# Patient Record
Sex: MALE | Race: WHITE | NOT HISPANIC OR LATINO | Employment: UNEMPLOYED | URBAN - NONMETROPOLITAN AREA
[De-identification: names, ages, dates, MRNs, and addresses within clinical notes are randomized per-mention and may not be internally consistent; named-entity substitution may affect disease eponyms.]

---

## 2017-09-26 ENCOUNTER — HISTORY (OUTPATIENT)
Dept: EMERGENCY MEDICINE | Facility: OTHER | Age: 32
End: 2017-09-26

## 2017-10-15 ENCOUNTER — OFFICE VISIT - GICH (OUTPATIENT)
Dept: FAMILY MEDICINE | Facility: OTHER | Age: 32
End: 2017-10-15

## 2017-10-15 ENCOUNTER — HISTORY (OUTPATIENT)
Dept: FAMILY MEDICINE | Facility: OTHER | Age: 32
End: 2017-10-15

## 2017-10-15 DIAGNOSIS — Z13.89 ENCOUNTER FOR SCREENING FOR OTHER DISORDER: ICD-10-CM

## 2017-10-15 LAB
AMPHETAMINES QUAL: NOT DETECTED
BARBITURATES QUAL UR: NOT DETECTED
BENZODIAZEPINES QUAL: NOT DETECTED
BUPRENORPHINE QUAL URINE: NOT DETECTED
COCAINE QUAL: NOT DETECTED
METHADONE QUAL URINE: NOT DETECTED
METHAMPHETAMINE QUAL URINE: NOT DETECTED
OPIATES QUALITATIVE URINE: NOT DETECTED
OXYCODONE QUAL: NOT DETECTED
PCP QUAL URINE: NOT DETECTED
PROPOXYPHENE,QUAL - HISTORICAL: NOT DETECTED
THC METABOLITES,QUAL - HISTORICAL: ABNORMAL
TRICYC ANTI QUAL URINE: NOT DETECTED

## 2017-10-25 ENCOUNTER — COMMUNICATION - GICH (OUTPATIENT)
Dept: FAMILY MEDICINE | Facility: OTHER | Age: 32
End: 2017-10-25

## 2017-10-25 ENCOUNTER — HISTORY (OUTPATIENT)
Dept: EMERGENCY MEDICINE | Facility: OTHER | Age: 32
End: 2017-10-25

## 2017-12-12 ENCOUNTER — HISTORY (OUTPATIENT)
Dept: FAMILY MEDICINE | Facility: OTHER | Age: 32
End: 2017-12-12

## 2017-12-12 ENCOUNTER — OFFICE VISIT - GICH (OUTPATIENT)
Dept: FAMILY MEDICINE | Facility: OTHER | Age: 32
End: 2017-12-12

## 2017-12-12 DIAGNOSIS — K08.89 OTHER SPECIFIED DISORDERS OF TEETH AND SUPPORTING STRUCTURES (CODE): ICD-10-CM

## 2017-12-28 NOTE — PROGRESS NOTES
"Patient Information     Patient Name MRN Sex     Von Santiago [id number] Male 1985      Progress Notes by Annemarie Layne MD at 10/15/2017  6:45 PM     Author:  Annemarie Layne MD Service:  (none) Author Type:  Physician     Filed:  10/15/2017  7:49 PM Encounter Date:  10/15/2017 Status:  Signed     :  Annemarie Layne MD (Physician)            SUBJECTIVE:    Von Santiago is a 32 y.o. male who presents to have drug testing as his wife will not believe him when he says he is not using drugs-\"hard drugs\". He does admit to smoking marijuana on his birthday and she is aware of that. He wants to prove to her that he is clean.   He does not have a local provider and rarely comes to the clinic with most visits to ED.  HPI    No Known Allergies,   No current outpatient prescriptions on file prior to visit.     No current facility-administered medications on file prior to visit.     and   Social History       Substance Use Topics         Smoking status:  Current Every Day Smoker      Packs/day: 1.00      Years: 21.00      Types: Cigarettes      Smokeless tobacco:  Never Used      Alcohol use  1.5 oz/week     3 drink(s) per week        REVIEW OF SYSTEMS:  ROS    OBJECTIVE:  /78  Pulse 88  Ht 1.829 m (6')    EXAM:   Physical Exam   Constitutional: He is well-developed, well-nourished, and in no distress. No distress.   Skin:   Many tatoos     Nursing note and vitals reviewed.    Results for orders placed or performed in visit on 10/15/17      DRUG ABUSE SCREEN RAPID URINE INHOUSE([facility])      Result  Value Ref Range    THC METABOLITES,QUAL      Presumptive Positive-Unconfirmed Result (A) Not Detected    PCP,QUAL                  Not Detected Not Detected    COCAINE,QUAL              Not Detected Not Detected    METHAMPHETAMINE, QUALITATIVE Not Detected Not Detected    OPIATES,QUAL              Not Detected Not Detected    AMPHETAMINE, QUALITATIVE Not Detected Not Detected    " BENZODIAZEPINES,QUAL      Not Detected Not Detected    TRICYCLICS,QUAL           Not Detected Not Detected    METHADONE, QUALITATIVE Not Detected Not Detected    BARBITURATES,QUAL         Not Detected Not Detected    OXYCODONE, QUALITATIVE Not Detected Not Detected    BUPRENORPHINE, QUALITATIVE Not Detected Not Detected    PROPOXYPHENE,QUAL Not Detected Not Detected     I have personally reviewed the labs listed above.    ASSESSMENT/PLAN:    ICD-10-CM    1. Screening for mental disease/developmental disorder Z13.89 DRUG ABUSE SCREEN [facility]INE [facility]SE([facility])      DRUG ABUSE SCREEN [facility]AP[facility] URINE INHOUSE([facility])        Plan:    Results for lab printed and given to patient.   Annemarie Layne MD  7:48 PM 10/15/2017

## 2017-12-28 NOTE — TELEPHONE ENCOUNTER
Patient Information     Patient Name MRN Sex Von Plummer [id number] Male 1985      Telephone Encounter by Pastora Pierce RN at 10/25/2017  9:52 AM     Author:  Pastora Pierce RN Service:  (none) Author Type:  NURS- Registered Nurse     Filed:  10/25/2017 10:10 AM Encounter Date:  10/25/2017 Status:  Signed     :  Pastora Pierce RN (NURS- Registered Nurse)            Pt called complaining of very sharp chest pain and extreme shortness of breath starting last night into this morning.  I instructed pt to present to the ED immediately.  Pt stated he would have somebody bring him in now.  Pastora Pierce RN ....................  10/25/2017   10:00 AM    Reason for Disposition    Difficulty breathing    Protocols used: ADULT CHEST PAIN-A-AH

## 2017-12-30 NOTE — NURSING NOTE
Patient Information     Patient Name MRN Sex Von Plummer [id number] Male 1985      Nursing Note by Heidi Link at 10/15/2017  6:45 PM     Author:  Heidi Link Service:  (none) Author Type:  (none)     Filed:  10/15/2017  7:38 PM Encounter Date:  10/15/2017 Status:  Signed     :  Heidi Link            Patient presents today for drug screening.  Heidi Link LPN..............10/15/2017 7:31 PM

## 2018-01-26 VITALS — DIASTOLIC BLOOD PRESSURE: 78 MMHG | HEART RATE: 88 BPM | SYSTOLIC BLOOD PRESSURE: 122 MMHG | HEIGHT: 72 IN

## 2018-02-09 VITALS
RESPIRATION RATE: 22 BRPM | DIASTOLIC BLOOD PRESSURE: 80 MMHG | TEMPERATURE: 98 F | HEART RATE: 88 BPM | SYSTOLIC BLOOD PRESSURE: 132 MMHG | HEIGHT: 72 IN | WEIGHT: 197.6 LBS | BODY MASS INDEX: 26.76 KG/M2

## 2018-02-12 NOTE — NURSING NOTE
Patient Information     Patient Name MRN Sex Von Plummer [id number] Male 1985      Nursing Note by Yasmin Coto at 2017  5:00 PM     Author:  Yasmin Coto Service:  (none) Author Type:  NURS- Student Practical Nurse     Filed:  2017  5:49 PM Encounter Date:  2017 Status:  Signed     :  Yasmin Coto (NURS- Student Practical Nurse)            Patient presents to the clinic for mouth problem. Started yesterday. No injury to mouth that patient is aware of. States its swollen and hurts really bad. Can't eat or drink.   Yasmin Coto, YESSENIA............................ 2017 5:20 PM

## 2018-02-12 NOTE — PATIENT INSTRUCTIONS
Patient Information     Patient Name MRN Sex Von Plummer [id number] Male 1985      Patient Instructions by Serina Blas NP at 2017  5:00 PM     Author:  Serina Blas NP Service:  (none) Author Type:  PHYS- Nurse Practitioner     Filed:  2017  6:03 PM Encounter Date:  2017 Status:  Signed     :  Serina Blas NP (PHYS- Nurse Practitioner)               Index Swedish   Toothache   ________________________________________________________________________  KEY POINTS    A toothache is pain in or around a tooth caused by an infection, a crack in a tooth or a loose filling, sore gums, or sore jaw muscles.    Fixing a loose crown, damaged filling, or crack in the tooth will often take care of the toothache. If you grind your teeth, a mouth guard may help. You may need antibiotics if you have an infection.    Have regular dental checkups and cleanings, including X-rays.  ________________________________________________________________________  What is a toothache?  A toothache is pain in or around a tooth.  What is the cause?  Possible causes of a toothache are:    An infected tooth or gum    Irritation of a tooth nerve by decay    Problem with your sinuses or jaw joint (TMJ)    A fracture or crack in a tooth    A damaged or loose filling in your tooth    Repetitive motions such as chewing gum more than usual or grinding your teeth during sleep    Gums that pull away from your teeth    A loose crown    Any injury that may have damaged the tooth    A problem with your bite such as having teeth that hit sooner and more heavily than others  What are the symptoms?  Tooth pain may be sharp or throbbing. It may be constant or it may hurt only when you put pressure on the tooth. Most often you will feel pain in the area of the problem tooth, but sometimes you may feel pain in a different area. Cold or heat may make the pain much worse.  In some  cases, you may have swelling around the tooth or get a fever or headache. Sometimes there is foul-tasting drainage from an infected tooth.  How is it diagnosed?  Your healthcare provider will examine your teeth, gums, and other areas around your mouth. Your provider may also check your ear, throat, jaw, or sinuses to see if they may be causing the pain. You may have blood tests or X-rays.  How is it treated?  Treatment depends on the cause. Fixing a loose crown, damaged filling, or crack in the tooth will often take care of the toothache. If you grind your teeth, a mouth guard may help. If you have an infection, antibiotics may be prescribed. If a tooth is infected, it is important to get treatment. A dental infection can spread to other parts of the body.  How can I take care of myself?  If you have tooth pain, see your dentist for treatment. Until you can see your dentist, take these steps to help relieve the pain:    Take nonprescription pain medicine, such as acetaminophen, ibuprofen, or naproxen. Read the label and take as directed. Unless recommended by your healthcare provider, you should not take these medicines for more than 10 days.    Nonsteroidal anti-inflammatory medicines (NSAIDs), such as ibuprofen, naproxen, and aspirin, may cause stomach bleeding and other problems. These risks increase with age.    Acetaminophen may cause liver damage or other problems. Unless recommended by your provider, don't take more than 3000 milligrams (mg) in 24 hours. To make sure you don t take too much, check other medicines you take to see if they also contain acetaminophen. Ask your provider if you need to avoid drinking alcohol while taking this medicine.    Stay away from hot, cold, or sweet foods or liquids that cause discomfort.    Chew on the side that doesn't cause pain.    Put an ice pack, gel pack, or package of frozen vegetables wrapped in a cloth on your jaw every 3 to 4 hours for up to 20 minutes at a  time.    Put moist heat on your jaw for up to 30 minutes to relieve pain. Moist heat includes heat patches, a warm wet washcloth, or a hot shower. Do not use a dry heating pad.    Rinse your mouth 3 to 4 times a day with warm saltwater. This may help relieve pain and swelling.  Ask your dentist what symptoms or problems you should watch for and what to do if you have them.  Make sure you know when you should come back for a checkup.  How can I help prevent toothache?  To help prevent tooth problems:    Brush your teeth correctly for at least 2 minutes twice a day. The most important time to brush is before you go to bed at night.    Floss correctly between your teeth once a day.    Gently massage your gums with a soft toothbrush.    Rinse daily with a fluoride or antibacterial, alcohol-free mouthwash.    Limit starchy or sugary foods that can lead to tooth decay or brush your teeth right after you eat these foods. Rinsing with water or chewing sugarless gum after you eat or drink foods that contain sugar can also help. Chewing gums sweetened with Xylitol can reduce and control bacteria in your mouth.    Have regular dental checkups and cleaning, including X-rays. You may want to ask your dentist about a fluoride treatment and sealants for teeth.  Developed by [employer].  Adult Advisor 2017.2 published by [employer].  Last modified: 2017-01-03  Last reviewed: 2017-01-03  This content is reviewed periodically and is subject to change as new health information becomes available. The information is intended to inform and educate and is not a replacement for medical evaluation, advice, diagnosis or treatment by a healthcare professional.  References   Adult Advisor 2017.2 Index    Copyright   2017 [employer], a division of [employer] All rights reserved.

## 2018-02-12 NOTE — PROGRESS NOTES
Patient Information     Patient Name MRN Sex     Von Santiago [id number] Male 1985      Progress Notes by Serina Blas NP at 2017  5:00 PM     Author:  Serina Blas NP Service:  (none) Author Type:  PHYS- Nurse Practitioner     Filed:  2017  9:40 AM Encounter Date:  2017 Status:  Signed     :  Serina Blas NP (PHYS- Nurse Practitioner)            HPI:  Nursing Notes:   Yasmin Coto  2017  5:49 PM  Signed  Patient presents to the clinic for mouth problem. Started yesterday. No injury to mouth that patient is aware of. States its swollen and hurts really bad. Can't eat or drink.   Yasmin Coto LPN............................ 2017 5:20 PM      Von Santiago is a 32 y.o. male who presents to clinic today bottom gums are swollen and feels like swelling is going down into chin, upper gums are swollen too-started yesterday. Hasn't been able to eat due to pain. Denies fevers or pus pockets. Treating with Ibuprofen, brushing, flossing and mouthwash. Has called dentist, can't get into for one month.    Past Medical History:     Diagnosis  Date     Anxiety      Chondromalacia     Bilateral knee discomfort,      Depression      Marijuana dependence (HC)      Meningitis     as an infant      Other activity(E029.9)     Mother with hepatitis C at time of his birth.      Past Surgical History:      Procedure  Laterality Date     PAST SURGICAL HISTORY      Unremarkable       Social History        Substance Use Topics          Smoking status:   Current Every Day Smoker      Packs/day:  0.50      Years:  21.00      Types:  Cigarettes      Smokeless tobacco:   Never Used      Alcohol use   Yes      Comment: occasionally       No current outpatient prescriptions on file.     No current facility-administered medications for this visit.      Medications have been reviewed by me and are current to the best of my knowledge and  ability.    No Known Allergies    ROS:  Refer to HPI    /80 (Cuff Site: Left Arm, Position: Sitting, Cuff Size: Adult Regular)  Pulse 88  Temp 98  F (36.7  C) (Tympanic)   Resp 22  Ht 1.829 m (6')  Wt 89.6 kg (197 lb 9.6 oz)  BMI 26.8 kg/m2    EXAM:  General Appearance: Well appearing male, appropriate appearance for age. No acute distress  Orophayrnx: teeth with multiple fillings and dark spots that appear to be cavities, gums are mildly swollen and erythematous, no pus or tenderness noted, no facial or jaw swelling noted  Neck: supple without adenopathy  Dermatological: no rashes or lesions  Psychological: normal affect, alert and pleasant    ASSESSMENT/PLAN:    ICD-10-CM    1. Pain, dental K08.89 ibuprofen (ADVIL; MOTRIN) 800 mg tablet      penicillin v potassium (PEN-VEE K) 250 mg tablet   Gum swelling and pain that started yesterday  On exam: well appearing male without fever,  teeth with multiple fillings and dark spots that appear to be cavities, gums are mildly swollen and erythematous, no pus or tenderness noted, no facial or jaw swelling noted  Diagnosis: Dental Infection  Treat with Ibuprofen 800 mgs PO TID prn pain  PCN Vee K 250 TID 10 days  Follow up with dentist as scheduled      Patient Instructions      Index Maori   Toothache   ________________________________________________________________________  KEY POINTS    A toothache is pain in or around a tooth caused by an infection, a crack in a tooth or a loose filling, sore gums, or sore jaw muscles.    Fixing a loose crown, damaged filling, or crack in the tooth will often take care of the toothache. If you grind your teeth, a mouth guard may help. You may need antibiotics if you have an infection.    Have regular dental checkups and cleanings, including X-rays.  ________________________________________________________________________  What is a toothache?  A toothache is pain in or around a tooth.  What is the cause?  Possible causes  of a toothache are:    An infected tooth or gum    Irritation of a tooth nerve by decay    Problem with your sinuses or jaw joint (TMJ)    A fracture or crack in a tooth    A damaged or loose filling in your tooth    Repetitive motions such as chewing gum more than usual or grinding your teeth during sleep    Gums that pull away from your teeth    A loose crown    Any injury that may have damaged the tooth    A problem with your bite such as having teeth that hit sooner and more heavily than others  What are the symptoms?  Tooth pain may be sharp or throbbing. It may be constant or it may hurt only when you put pressure on the tooth. Most often you will feel pain in the area of the problem tooth, but sometimes you may feel pain in a different area. Cold or heat may make the pain much worse.  In some cases, you may have swelling around the tooth or get a fever or headache. Sometimes there is foul-tasting drainage from an infected tooth.  How is it diagnosed?  Your healthcare provider will examine your teeth, gums, and other areas around your mouth. Your provider may also check your ear, throat, jaw, or sinuses to see if they may be causing the pain. You may have blood tests or X-rays.  How is it treated?  Treatment depends on the cause. Fixing a loose crown, damaged filling, or crack in the tooth will often take care of the toothache. If you grind your teeth, a mouth guard may help. If you have an infection, antibiotics may be prescribed. If a tooth is infected, it is important to get treatment. A dental infection can spread to other parts of the body.  How can I take care of myself?  If you have tooth pain, see your dentist for treatment. Until you can see your dentist, take these steps to help relieve the pain:    Take nonprescription pain medicine, such as acetaminophen, ibuprofen, or naproxen. Read the label and take as directed. Unless recommended by your healthcare provider, you should not take these medicines  for more than 10 days.    Nonsteroidal anti-inflammatory medicines (NSAIDs), such as ibuprofen, naproxen, and aspirin, may cause stomach bleeding and other problems. These risks increase with age.    Acetaminophen may cause liver damage or other problems. Unless recommended by your provider, don't take more than 3000 milligrams (mg) in 24 hours. To make sure you don t take too much, check other medicines you take to see if they also contain acetaminophen. Ask your provider if you need to avoid drinking alcohol while taking this medicine.    Stay away from hot, cold, or sweet foods or liquids that cause discomfort.    Chew on the side that doesn't cause pain.    Put an ice pack, gel pack, or package of frozen vegetables wrapped in a cloth on your jaw every 3 to 4 hours for up to 20 minutes at a time.    Put moist heat on your jaw for up to 30 minutes to relieve pain. Moist heat includes heat patches, a warm wet washcloth, or a hot shower. Do not use a dry heating pad.    Rinse your mouth 3 to 4 times a day with warm saltwater. This may help relieve pain and swelling.  Ask your dentist what symptoms or problems you should watch for and what to do if you have them.  Make sure you know when you should come back for a checkup.  How can I help prevent toothache?  To help prevent tooth problems:    Brush your teeth correctly for at least 2 minutes twice a day. The most important time to brush is before you go to bed at night.    Floss correctly between your teeth once a day.    Gently massage your gums with a soft toothbrush.    Rinse daily with a fluoride or antibacterial, alcohol-free mouthwash.    Limit starchy or sugary foods that can lead to tooth decay or brush your teeth right after you eat these foods. Rinsing with water or chewing sugarless gum after you eat or drink foods that contain sugar can also help. Chewing gums sweetened with Xylitol can reduce and control bacteria in your mouth.    Have regular dental  checkups and cleaning, including X-rays. You may want to ask your dentist about a fluoride treatment and sealants for teeth.  Developed by [employer].  Adult Advisor 2017.2 published by [employer].  Last modified: 2017-01-03  Last reviewed: 2017-01-03  This content is reviewed periodically and is subject to change as new health information becomes available. The information is intended to inform and educate and is not a replacement for medical evaluation, advice, diagnosis or treatment by a healthcare professional.  References   Adult Advisor 2017.2 Index    Copyright   2017 [employer], a division of [employer] All rights reserved.

## 2018-02-27 ENCOUNTER — DOCUMENTATION ONLY (OUTPATIENT)
Dept: FAMILY MEDICINE | Facility: OTHER | Age: 33
End: 2018-02-27

## 2018-08-29 ENCOUNTER — HOSPITAL ENCOUNTER (EMERGENCY)
Facility: OTHER | Age: 33
Discharge: HOME | End: 2018-08-29
Attending: PHYSICIAN ASSISTANT | Admitting: PHYSICIAN ASSISTANT
Payer: COMMERCIAL

## 2018-08-29 ENCOUNTER — APPOINTMENT (OUTPATIENT)
Dept: GENERAL RADIOLOGY | Facility: OTHER | Age: 33
End: 2018-08-29
Attending: PHYSICIAN ASSISTANT
Payer: COMMERCIAL

## 2018-08-29 VITALS
HEART RATE: 95 BPM | WEIGHT: 190 LBS | OXYGEN SATURATION: 99 % | DIASTOLIC BLOOD PRESSURE: 81 MMHG | TEMPERATURE: 98.9 F | HEIGHT: 72 IN | RESPIRATION RATE: 16 BRPM | SYSTOLIC BLOOD PRESSURE: 122 MMHG | BODY MASS INDEX: 25.73 KG/M2

## 2018-08-29 DIAGNOSIS — J02.9 ACUTE PHARYNGITIS, UNSPECIFIED ETIOLOGY: ICD-10-CM

## 2018-08-29 DIAGNOSIS — R50.9 FEVER, UNSPECIFIED FEVER CAUSE: ICD-10-CM

## 2018-08-29 DIAGNOSIS — R52 BODY ACHES: ICD-10-CM

## 2018-08-29 LAB
ALBUMIN SERPL-MCNC: 4.2 G/DL (ref 3.5–5.7)
ALP SERPL-CCNC: 57 U/L (ref 34–104)
ALT SERPL W P-5'-P-CCNC: 20 U/L (ref 7–52)
ANION GAP SERPL CALCULATED.3IONS-SCNC: 7 MMOL/L (ref 3–14)
AST SERPL W P-5'-P-CCNC: 19 U/L (ref 13–39)
BASOPHILS # BLD AUTO: 0 10E9/L (ref 0–0.2)
BASOPHILS NFR BLD AUTO: 0.4 %
BILIRUB SERPL-MCNC: 0.9 MG/DL (ref 0.3–1)
BUN SERPL-MCNC: 15 MG/DL (ref 7–25)
CALCIUM SERPL-MCNC: 9.7 MG/DL (ref 8.6–10.3)
CHLORIDE SERPL-SCNC: 100 MMOL/L (ref 98–107)
CO2 SERPL-SCNC: 26 MMOL/L (ref 21–31)
CREAT SERPL-MCNC: 1.11 MG/DL (ref 0.7–1.3)
DEPRECATED S PYO AG THROAT QL EIA: NORMAL
DIFFERENTIAL METHOD BLD: NORMAL
EOSINOPHIL # BLD AUTO: 0 10E9/L (ref 0–0.7)
EOSINOPHIL NFR BLD AUTO: 0.3 %
ERYTHROCYTE [DISTWIDTH] IN BLOOD BY AUTOMATED COUNT: 13.3 % (ref 10–15)
GFR SERPL CREATININE-BSD FRML MDRD: 76 ML/MIN/1.7M2
GLUCOSE SERPL-MCNC: 81 MG/DL (ref 70–105)
HCT VFR BLD AUTO: 43.5 % (ref 40–53)
HGB BLD-MCNC: 15 G/DL (ref 13.3–17.7)
IMM GRANULOCYTES # BLD: 0 10E9/L (ref 0–0.4)
IMM GRANULOCYTES NFR BLD: 0.4 %
LYMPHOCYTES # BLD AUTO: 1.7 10E9/L (ref 0.8–5.3)
LYMPHOCYTES NFR BLD AUTO: 17 %
MCH RBC QN AUTO: 29.4 PG (ref 26.5–33)
MCHC RBC AUTO-ENTMCNC: 34.5 G/DL (ref 31.5–36.5)
MCV RBC AUTO: 85 FL (ref 78–100)
MONOCYTES # BLD AUTO: 1.2 10E9/L (ref 0–1.3)
MONOCYTES NFR BLD AUTO: 11.8 %
NEUTROPHILS # BLD AUTO: 7 10E9/L (ref 1.6–8.3)
NEUTROPHILS NFR BLD AUTO: 70.1 %
PLATELET # BLD AUTO: 228 10E9/L (ref 150–450)
POTASSIUM SERPL-SCNC: 4 MMOL/L (ref 3.5–5.1)
PROT SERPL-MCNC: 7.7 G/DL (ref 6.4–8.9)
RBC # BLD AUTO: 5.11 10E12/L (ref 4.4–5.9)
SODIUM SERPL-SCNC: 133 MMOL/L (ref 134–144)
SPECIMEN SOURCE: NORMAL
WBC # BLD AUTO: 10 10E9/L (ref 4–11)

## 2018-08-29 PROCEDURE — 71046 X-RAY EXAM CHEST 2 VIEWS: CPT

## 2018-08-29 PROCEDURE — 80053 COMPREHEN METABOLIC PANEL: CPT | Performed by: PHYSICIAN ASSISTANT

## 2018-08-29 PROCEDURE — 87880 STREP A ASSAY W/OPTIC: CPT | Performed by: EMERGENCY MEDICINE

## 2018-08-29 PROCEDURE — 99283 EMERGENCY DEPT VISIT LOW MDM: CPT | Mod: Z6 | Performed by: PHYSICIAN ASSISTANT

## 2018-08-29 PROCEDURE — 99284 EMERGENCY DEPT VISIT MOD MDM: CPT | Mod: 25 | Performed by: PHYSICIAN ASSISTANT

## 2018-08-29 PROCEDURE — 85025 COMPLETE CBC W/AUTO DIFF WBC: CPT | Performed by: PHYSICIAN ASSISTANT

## 2018-08-29 PROCEDURE — 36415 COLL VENOUS BLD VENIPUNCTURE: CPT | Performed by: PHYSICIAN ASSISTANT

## 2018-08-29 ASSESSMENT — ENCOUNTER SYMPTOMS
CHILLS: 1
ARTHRALGIAS: 0
NECK STIFFNESS: 0
SHORTNESS OF BREATH: 0
EYE REDNESS: 0
FEVER: 1
NAUSEA: 0
COLOR CHANGE: 0
ABDOMINAL PAIN: 0
CONFUSION: 0
SORE THROAT: 1
TROUBLE SWALLOWING: 0
DIFFICULTY URINATING: 0
VOMITING: 0
HEADACHES: 0
VOICE CHANGE: 0
DIARRHEA: 0
CONSTIPATION: 0

## 2018-08-29 NOTE — ED AVS SNAPSHOT
[facility]    [address]    [place] [address]    Phone:  [phone]    Fax:  [phone]                                       Von Santiago   MRN: [id number]    Department:  [facility]   Date of Visit:  8/29/2018           Patient Information     Date Of Birth          1985        Your diagnoses for this visit were:     Acute pharyngitis, unspecified etiology     Body aches     Fever, unspecified fever cause        You were seen by Boo Holguin PA-C.      Follow-up Information     Schedule an appointment as soon as possible for a visit with No Ref-Primary, Physician.    Why:  As needed, If symptoms worsen      Discharge References/Attachments     SORE THROAT, WHEN YOU HAVE A (ENGLISH)    SORE THROATS, SELF-CARE FOR (ENGLISH)    PHARYNGITIS, REPORT PENDING (ENGLISH)      24 Hour Appointment Hotline     To schedule an appointment at [facility], please call [phone]. If you don't have a family doctor or clinic, we will help you find one. [facility] clinics are conveniently located to serve the needs of you and your family.           Review of your medicines      START taking        Dose / Directions Last dose taken    amoxicillin-clavulanate 875-125 MG per tablet   Commonly known as:  AUGMENTIN   Dose:  1 tablet   Quantity:  20 tablet        Take 1 tablet by mouth 2 times daily for 10 days   Refills:  0          Our records show that you are taking the medicines listed below. If these are incorrect, please call your family doctor or clinic.        Dose / Directions Last dose taken    GABAPENTIN PO        Refills:  0                Prescriptions were sent or printed at these locations (1 Prescription)                   [facility] [address] [place], [place] - [address]   [address], [facility] [address]    Telephone:  [phone]   Fax:  [phone]   Hours:                  Printed at  "Department/Unit printer (1 of 1)         amoxicillin-clavulanate (AUGMENTIN) 875-125 MG per tablet                Procedures and tests performed during your visit     CBC with platelets differential    Comprehensive metabolic panel    Rapid strep screen    XR Chest 2 Views      Orders Needing Specimen Collection     None      Pending Results     No orders found from 2018 to 2018.            Pending Culture Results     No orders found from 2018 to 2018.            Pending Results Instructions     If you had any lab results that were not finalized at the time of your Discharge, you can call the ED Lab Result RN at [phone]. You will be contacted by this team for any positive Lab results or changes in treatment. The nurses are available 7 days a week from 10A to 6:30P.  You can leave a message 24 hours per day and they will return your call.        Thank you for choosing [facility]       Thank you for choosing [facility] for your care. Our goal is always to provide you with excellent care. Hearing back from our patients is one way we can continue to improve our services. Please take a few minutes to complete the written survey that you may receive in the mail after you visit with us. Thank you!        [employer] Information     [employer] lets you send messages to your doctor, view your test results, renew your prescriptions, schedule appointments and more. To sign up, go to [email or web address] . Click on \"Log in\" on the left side of the screen, which will take you to the Welcome page. Then click on \"Sign up Now\" on the right side of the page.     You will be asked to enter the access code listed below, as well as some personal information. Please follow the directions to create your username and password.     Your access code is: [email or web address]  Expires: 2018  3:40 PM     Your access code will  in 90 days. If you need help or a new code, please call your [facility] clinic or [phone].   "      Care EveryWhere ID     This is your Care EveryWhere ID. This could be used by other organizations to access your [facility] medical records  [id number]        Equal Access to Services     GAIL REED : Pat Juarez, jeff dowling, venessa martinez. So [facility] [phone].    ATENCIÓN: Si habla español, tiene a palomino disposición servicios gratuitos de asistencia lingüística. Llame al [phone].    We comply with applicable federal civil rights laws and [place] laws. We do not discriminate on the basis of race, color, national origin, age, disability, sex, sexual orientation, or gender identity.            After Visit Summary       This is your record. Keep this with you and show to your community pharmacist(s) and doctor(s) at your next visit.

## 2018-08-29 NOTE — ED PROVIDER NOTES
History   No chief complaint on file.    HPI Comments: This is a 32-year-old male who reports he just feels sick.  He has had a sore throat and cough as well as body aches over the past 3 days.  Reports his son had what he thinks is strep however he has not been formally tested.  Denies any lightheadedness or dizziness.  No nausea or vomiting or diarrhea.    The history is provided by the patient and the spouse.     Patient Active Problem List    Diagnosis Date Noted     Esophageal reflux 02/14/2012     Priority: Medium        Past Medical History:    Past Medical History:   Diagnosis Date     Activity, other specified      Anxiety disorder      Cannabis dependence, uncomplicated (H)      Chondromalacia      Major depressive disorder, single episode      Meningitis        Past Surgical History:    Past Surgical History:   Procedure Laterality Date     OTHER SURGICAL HISTORY      24376.0,PAST SURGICAL HISTORY,Unremarkable       Family History:    Family History   Problem Relation Age of Onset     Other - See Comments Mother      Mother with hepatitis C at time of his birth.     Diabetes Father      Diabetes       Social History:  Marital Status:   [2]  Social History   Substance Use Topics     Smoking status: Current Every Day Smoker     Packs/day: 0.50     Years: 21.00     Types: Cigarettes     Smokeless tobacco: Never Used     Alcohol use Yes      Comment: Alcoholic Drinks/day: occasionally        Medications:      GABAPENTIN PO         Review of Systems   Constitutional: Positive for chills and fever.   HENT: Positive for sore throat. Negative for congestion, trouble swallowing and voice change.    Eyes: Negative for redness.   Respiratory: Negative for shortness of breath.    Cardiovascular: Negative for chest pain.   Gastrointestinal: Negative for abdominal pain, constipation, diarrhea, nausea and vomiting.   Genitourinary: Negative for difficulty urinating.   Musculoskeletal: Negative for arthralgias  and neck stiffness.   Skin: Negative for color change.   Neurological: Negative for headaches.   Psychiatric/Behavioral: Negative for confusion.       Physical Exam   BP: 132/76  Pulse: 95  Temp: 98.9  F (37.2  C)  Resp: 16  Height: 182.9 cm (6')  Weight: 86.2 kg (190 lb)  SpO2: 99 %      Physical Exam   Constitutional: He is oriented to person, place, and time. No distress.   HENT:   Head: Atraumatic.   Right Ear: Tympanic membrane is erythematous and bulging.   Left Ear: Tympanic membrane is bulging.   Mouth/Throat: Mucous membranes are not pale, not dry and not cyanotic. No dental abscesses or uvula swelling. Posterior oropharyngeal erythema present. No oropharyngeal exudate, posterior oropharyngeal edema or tonsillar abscesses.   Eyes: Pupils are equal, round, and reactive to light. No scleral icterus.   Cardiovascular: Normal heart sounds and intact distal pulses.    Pulmonary/Chest: Breath sounds normal. No respiratory distress.   Abdominal: Soft. Bowel sounds are normal. There is no tenderness.   Musculoskeletal: Normal range of motion. He exhibits no edema or tenderness.   Neurological: He is alert and oriented to person, place, and time.   Skin: Skin is warm. No rash noted. He is not diaphoretic.       ED Course     ED Course     Procedures           Results for orders placed or performed during the hospital encounter of 08/29/18 (from the past 24 hour(s))   Rapid strep screen   Result Value Ref Range    Specimen Description Throat     Rapid Strep A Screen       Negative presumptive for Group A Beta Streptococcus   CBC with platelets differential   Result Value Ref Range    WBC 10.0 4.0 - 11.0 10e9/L    RBC Count 5.11 4.4 - 5.9 10e12/L    Hemoglobin 15.0 13.3 - 17.7 g/dL    Hematocrit 43.5 40.0 - 53.0 %    MCV 85 78 - 100 fl    MCH 29.4 26.5 - 33.0 pg    MCHC 34.5 31.5 - 36.5 g/dL    RDW 13.3 10.0 - 15.0 %    Platelet Count 228 150 - 450 10e9/L    Diff Method Automated Method     % Neutrophils 70.1 %    %  Lymphocytes 17.0 %    % Monocytes 11.8 %    % Eosinophils 0.3 %    % Basophils 0.4 %    % Immature Granulocytes 0.4 %    Absolute Neutrophil 7.0 1.6 - 8.3 10e9/L    Absolute Lymphocytes 1.7 0.8 - 5.3 10e9/L    Absolute Monocytes 1.2 0.0 - 1.3 10e9/L    Absolute Eosinophils 0.0 0.0 - 0.7 10e9/L    Absolute Basophils 0.0 0.0 - 0.2 10e9/L    Abs Immature Granulocytes 0.0 0 - 0.4 10e9/L   Comprehensive metabolic panel   Result Value Ref Range    Sodium 133 (L) 134 - 144 mmol/L    Potassium 4.0 3.5 - 5.1 mmol/L    Chloride 100 98 - 107 mmol/L    Carbon Dioxide 26 21 - 31 mmol/L    Anion Gap 7 3 - 14 mmol/L    Glucose 81 70 - 105 mg/dL    Urea Nitrogen 15 7 - 25 mg/dL    Creatinine 1.11 0.70 - 1.30 mg/dL    GFR Estimate 76 >60 mL/min/1.7m2    GFR Estimate If Black >90 >60 mL/min/1.7m2    Calcium 9.7 8.6 - 10.3 mg/dL    Bilirubin Total 0.9 0.3 - 1.0 mg/dL    Albumin 4.2 3.5 - 5.7 g/dL    Protein Total 7.7 6.4 - 8.9 g/dL    Alkaline Phosphatase 57 34 - 104 U/L    ALT 20 7 - 52 U/L    AST 19 13 - 39 U/L   XR Chest 2 Views    Narrative    PROCEDURE: XR CHEST 2 VW 8/29/2018 2:59 PM    HISTORY: cough;     COMPARISONS: 10/25/2017.    TECHNIQUE: 2 views.    FINDINGS: Heart and pulmonary vasculature are normal. Lungs are clear  and no pleural effusion is seen.         Impression    IMPRESSION: No acute disease.    ANA JOY MD       Medications - No data to display    Assessments & Plan (with Medical Decision Making)     I have reviewed the nursing notes.    I have reviewed the findings, diagnosis, plan and need for follow up with the patient.      Discharge Medication List as of 8/29/2018  3:40 PM      START taking these medications    Details   amoxicillin-clavulanate (AUGMENTIN) 875-125 MG per tablet Take 1 tablet by mouth 2 times daily for 10 days, Disp-20 tablet, R-0, Local Print             Final diagnoses:   Acute pharyngitis, unspecified etiology   Body aches   Fever, unspecified fever cause     Afebrile at this  time.  Vital signs stable.  3 day history of increasingly worse sore throat.  He reports he thinks his son has strep however none of this has been tested.  Increasing body aches and weakness and fatigue.  Strep test initially is negative.  Cultures pending.  He does have erythemic throat but no obvious exudate.  CBC shows normal white blood cells no left shift.  CMP is unremarkable with only minimal decrease in sodium at 133.  Chest x-ray shows no is consolidation.  He does have pharyngitis and given his body aches and fevers at home although this was not witnessed here.  We will treat him empirically he was started on Augmentin at this time.  Discussed Tylenol and Motrin for fever reduction and body aches and pains.  He denies the need for a work note.  Follow-up with his primary care if symptoms persist for further evaluation as needed.  8/29/2018   [facility]     Boo Holguin PA-C  08/29/18 [id number]

## 2018-08-29 NOTE — ED AVS SNAPSHOT
[facility]    [address]    [place] [address]    Phone:  [phone]    Fax:  [phone]                                       Von Santiago   MRN: [id number]    Department:  [facility]   Date of Visit:  8/29/2018           After Visit Summary Signature Page     I have received my discharge instructions, and my questions have been answered. I have discussed any challenges I see with this plan with the nurse or doctor.    ..........................................................................................................................................  Patient/Patient Representative Signature      ..........................................................................................................................................  Patient Representative Print Name and Relationship to Patient    ..................................................               ................................................  Date                                            Time    ..........................................................................................................................................  Reviewed by Signature/Title    ...................................................              ..............................................  Date                                                            Time          22EPIC Rev 08/18

## 2018-08-29 NOTE — ED TRIAGE NOTES
"Pt comes in reporting he is \"sick\". Pt stated \"everything\" was wrong. Pt thinks he has strep and c/o sore throat. Pt has nausea, and denies vomiting or diarrhea.    Sara Mathew RN on 8/29/2018 at 2:05 PM    "

## 2018-11-20 ENCOUNTER — APPOINTMENT (OUTPATIENT)
Dept: GENERAL RADIOLOGY | Facility: OTHER | Age: 33
End: 2018-11-20
Attending: PHYSICIAN ASSISTANT
Payer: COMMERCIAL

## 2018-11-20 ENCOUNTER — HOSPITAL ENCOUNTER (EMERGENCY)
Facility: OTHER | Age: 33
Discharge: HOME | End: 2018-11-20
Attending: PHYSICIAN ASSISTANT | Admitting: PHYSICIAN ASSISTANT
Payer: COMMERCIAL

## 2018-11-20 VITALS
DIASTOLIC BLOOD PRESSURE: 70 MMHG | OXYGEN SATURATION: 96 % | SYSTOLIC BLOOD PRESSURE: 117 MMHG | HEART RATE: 89 BPM | BODY MASS INDEX: 27.63 KG/M2 | HEIGHT: 72 IN | WEIGHT: 204 LBS | RESPIRATION RATE: 12 BRPM | TEMPERATURE: 99.5 F

## 2018-11-20 DIAGNOSIS — K62.5 RECTAL BLEEDING: ICD-10-CM

## 2018-11-20 DIAGNOSIS — K64.4 EXTERNAL HEMORRHOIDS: ICD-10-CM

## 2018-11-20 DIAGNOSIS — K29.70 VIRAL GASTRITIS: ICD-10-CM

## 2018-11-20 DIAGNOSIS — K59.01 SLOW TRANSIT CONSTIPATION: ICD-10-CM

## 2018-11-20 LAB
ALBUMIN SERPL-MCNC: 4.1 G/DL (ref 3.5–5.7)
ALBUMIN UR-MCNC: NEGATIVE MG/DL
ALP SERPL-CCNC: 49 U/L (ref 34–104)
ALT SERPL W P-5'-P-CCNC: 23 U/L (ref 7–52)
ANION GAP SERPL CALCULATED.3IONS-SCNC: 7 MMOL/L (ref 3–14)
APPEARANCE UR: CLEAR
AST SERPL W P-5'-P-CCNC: 19 U/L (ref 13–39)
BASOPHILS # BLD AUTO: 0.1 10E9/L (ref 0–0.2)
BASOPHILS NFR BLD AUTO: 0.4 %
BILIRUB SERPL-MCNC: 0.6 MG/DL (ref 0.3–1)
BILIRUB UR QL STRIP: NEGATIVE
BUN SERPL-MCNC: 19 MG/DL (ref 7–25)
CALCIUM SERPL-MCNC: 9.5 MG/DL (ref 8.6–10.3)
CHLORIDE SERPL-SCNC: 108 MMOL/L (ref 98–107)
CO2 SERPL-SCNC: 26 MMOL/L (ref 21–31)
COLOR UR AUTO: YELLOW
CREAT SERPL-MCNC: 1.01 MG/DL (ref 0.7–1.3)
D DIMER PPP DDU-MCNC: <200 NG/ML D-DU (ref 0–230)
DIFFERENTIAL METHOD BLD: ABNORMAL
EOSINOPHIL # BLD AUTO: 0.2 10E9/L (ref 0–0.7)
EOSINOPHIL NFR BLD AUTO: 1.8 %
ERYTHROCYTE [DISTWIDTH] IN BLOOD BY AUTOMATED COUNT: 13.4 % (ref 10–15)
GFR SERPL CREATININE-BSD FRML MDRD: 84 ML/MIN/1.7M2
GLUCOSE SERPL-MCNC: 115 MG/DL (ref 70–105)
GLUCOSE UR STRIP-MCNC: NEGATIVE MG/DL
HCT VFR BLD AUTO: 41.3 % (ref 40–53)
HGB BLD-MCNC: 14.4 G/DL (ref 13.3–17.7)
HGB UR QL STRIP: NEGATIVE
IMM GRANULOCYTES # BLD: 0.1 10E9/L (ref 0–0.4)
IMM GRANULOCYTES NFR BLD: 0.4 %
KETONES UR STRIP-MCNC: NEGATIVE MG/DL
LEUKOCYTE ESTERASE UR QL STRIP: NEGATIVE
LYMPHOCYTES # BLD AUTO: 2.1 10E9/L (ref 0.8–5.3)
LYMPHOCYTES NFR BLD AUTO: 18.4 %
MAGNESIUM SERPL-MCNC: 2 MG/DL (ref 1.9–2.7)
MCH RBC QN AUTO: 29.4 PG (ref 26.5–33)
MCHC RBC AUTO-ENTMCNC: 34.9 G/DL (ref 31.5–36.5)
MCV RBC AUTO: 85 FL (ref 78–100)
MONOCYTES # BLD AUTO: 0.5 10E9/L (ref 0–1.3)
MONOCYTES NFR BLD AUTO: 4.7 %
NEUTROPHILS # BLD AUTO: 8.5 10E9/L (ref 1.6–8.3)
NEUTROPHILS NFR BLD AUTO: 74.3 %
NITRATE UR QL: NEGATIVE
PH UR STRIP: 7 PH (ref 5–9)
PLATELET # BLD AUTO: 264 10E9/L (ref 150–450)
POTASSIUM SERPL-SCNC: 3.7 MMOL/L (ref 3.5–5.1)
PROT SERPL-MCNC: 6.8 G/DL (ref 6.4–8.9)
RBC # BLD AUTO: 4.89 10E12/L (ref 4.4–5.9)
SODIUM SERPL-SCNC: 141 MMOL/L (ref 134–144)
SOURCE: NORMAL
SP GR UR STRIP: 1.02 (ref 1–1.03)
UROBILINOGEN UR STRIP-ACNC: 0.2 EU/DL (ref 0.2–1)
WBC # BLD AUTO: 11.4 10E9/L (ref 4–11)

## 2018-11-20 PROCEDURE — 25000132 ZZH RX MED GY IP 250 OP 250 PS 637: Performed by: PHYSICIAN ASSISTANT

## 2018-11-20 PROCEDURE — 85379 FIBRIN DEGRADATION QUANT: CPT | Performed by: PHYSICIAN ASSISTANT

## 2018-11-20 PROCEDURE — 25000131 ZZH RX MED GY IP 250 OP 636 PS 637: Performed by: PHYSICIAN ASSISTANT

## 2018-11-20 PROCEDURE — 99284 EMERGENCY DEPT VISIT MOD MDM: CPT | Mod: 25 | Performed by: PHYSICIAN ASSISTANT

## 2018-11-20 PROCEDURE — 83735 ASSAY OF MAGNESIUM: CPT | Performed by: PHYSICIAN ASSISTANT

## 2018-11-20 PROCEDURE — 80053 COMPREHEN METABOLIC PANEL: CPT | Performed by: PHYSICIAN ASSISTANT

## 2018-11-20 PROCEDURE — 36415 COLL VENOUS BLD VENIPUNCTURE: CPT | Performed by: PHYSICIAN ASSISTANT

## 2018-11-20 PROCEDURE — 85025 COMPLETE CBC W/AUTO DIFF WBC: CPT | Performed by: PHYSICIAN ASSISTANT

## 2018-11-20 PROCEDURE — 74019 RADEX ABDOMEN 2 VIEWS: CPT

## 2018-11-20 PROCEDURE — 99283 EMERGENCY DEPT VISIT LOW MDM: CPT | Mod: Z6 | Performed by: PHYSICIAN ASSISTANT

## 2018-11-20 PROCEDURE — 81003 URINALYSIS AUTO W/O SCOPE: CPT | Performed by: PHYSICIAN ASSISTANT

## 2018-11-20 RX ORDER — METOCLOPRAMIDE 10 MG/1
10 TABLET ORAL ONCE
Status: COMPLETED | OUTPATIENT
Start: 2018-11-20 | End: 2018-11-20

## 2018-11-20 RX ORDER — METOCLOPRAMIDE 5 MG/1
10 TABLET ORAL 3 TIMES DAILY PRN
Qty: 20 TABLET | Refills: 0 | Status: SHIPPED | OUTPATIENT
Start: 2018-11-20 | End: 2018-12-30

## 2018-11-20 RX ORDER — DOCUSATE SODIUM 100 MG/1
200 CAPSULE, LIQUID FILLED ORAL ONCE
Status: COMPLETED | OUTPATIENT
Start: 2018-11-20 | End: 2018-11-20

## 2018-11-20 RX ORDER — ASPIRIN 81 MG
100 TABLET, DELAYED RELEASE (ENTERIC COATED) ORAL 2 TIMES DAILY
Qty: 10 TABLET | Refills: 1 | Status: SHIPPED | OUTPATIENT
Start: 2018-11-20 | End: 2018-12-30

## 2018-11-20 RX ORDER — FAMOTIDINE 20 MG/1
20 TABLET, FILM COATED ORAL ONCE
Status: COMPLETED | OUTPATIENT
Start: 2018-11-20 | End: 2018-11-20

## 2018-11-20 RX ORDER — ONDANSETRON 4 MG/1
4 TABLET, ORALLY DISINTEGRATING ORAL ONCE
Status: COMPLETED | OUTPATIENT
Start: 2018-11-20 | End: 2018-11-20

## 2018-11-20 RX ORDER — MAGNESIUM CARB/ALUMINUM HYDROX 105-160MG
296 TABLET,CHEWABLE ORAL ONCE
Status: COMPLETED | OUTPATIENT
Start: 2018-11-20 | End: 2018-11-20

## 2018-11-20 RX ADMIN — FAMOTIDINE 20 MG: 20 TABLET, FILM COATED ORAL at 16:56

## 2018-11-20 RX ADMIN — ONDANSETRON 4 MG: 4 TABLET, ORALLY DISINTEGRATING ORAL at 16:56

## 2018-11-20 RX ADMIN — MAGNESIUM CITRATE 296 ML: 1.75 LIQUID ORAL at 17:59

## 2018-11-20 RX ADMIN — DOCUSATE SODIUM 200 MG: 100 CAPSULE, LIQUID FILLED ORAL at 17:59

## 2018-11-20 RX ADMIN — METOCLOPRAMIDE 10 MG: 10 TABLET ORAL at 17:59

## 2018-11-20 ASSESSMENT — ENCOUNTER SYMPTOMS
DIZZINESS: 0
DIARRHEA: 1
WEAKNESS: 1
CHILLS: 0
VOMITING: 0
FEVER: 0
ABDOMINAL PAIN: 1
NAUSEA: 0
APPETITE CHANGE: 0
FATIGUE: 0
LIGHT-HEADEDNESS: 0

## 2018-11-20 NOTE — DISCHARGE INSTRUCTIONS
Treating Constipation    Constipation is a common and often uncomfortable problem. Constipation means you have bowel movements fewer than 3 times per week, or strain to pass hard, dry stool. It can last a short time. Or it can be a problem that never seems to go away. The good news is that it can often be treated and controlled.  Eat more fiber  One of the best ways to help treat constipation is to increase your fiber intake. You can do this either through diet or by using fiber supplements. Fiber (in whole grains, fruits, and vegetables) adds bulk and absorbs water to soften the stool. This helps the stool pass through the colon more easily. When you increase your fiber intake, do it slowly to avoid side effects such as bloating. Also increase the amount of water that you drink. Eating more of the following foods can add fiber to your diet.    High-fiber cereals    Whole grains, bran, and brown rice    Vegetables such as carrots, broccoli, and greens    Fresh fruits (especially apples, pears, and dried fruits like raisins and apricots)    Nuts and legumes (especially beans such as lentils, kidney beans, and lima beans)  Get physically active  Exercise helps improve the working of your colon which helps ease constipation. Try to get some physical activity every day. If you haven t been active for a while, talk to your healthcare provider before starting again.  Laxatives  Your healthcare provider may suggest an over-the-counter product to help ease your constipation. He or she may suggest the use of bulk-forming agents or laxatives. The use of laxatives, if used as directed, is common and safe. Follow directions carefully when using them. See your healthcare provider for new-onset constipation, or long-term constipation, to rule out other causes such as medicines or thyroid disease.  Date Last Reviewed: 7/1/2016 2000-2018 [employer]. [address] [place] [address], [place] [address]. All rights reserved.  This information is not intended as a substitute for professional medical care. Always follow your healthcare professional's instructions.          Eating a High-Fiber Diet  Fiber is what gives strength and structure to plants. Most grains, beans, vegetables, and fruits contain fiber. Foods rich in fiber are often low in calories and fat, and they fill you up more. They may also reduce your risks for certain health problems. To find out the amount of fiber in canned, packaged, or frozen foods, read the Nutrition Facts label. It tells you how much fiber is in one serving.    Types of fiber and their benefits  There are two types of fiber: insoluble and soluble. They both aid digestion and help you maintain a healthy weight.    Insoluble fiber. This is found in whole grains, cereals, certain fruits and vegetables such as apple skin, corn, and carrots. Insoluble fiber may prevent constipation and reduce the risk for certain types of cancer. It is called insoluble because it does not dissolve in water.    Soluble fiber. This type of fiber is in oats, beans, and certain fruits and vegetables such as strawberries and peas. Soluble fiber can reduce cholesterol, which may help lower the risk for heart disease. It also helps control blood sugar levels.  Look for high-fiber foods  Try these foods to add fiber to your diet:    Whole-grain breads and cereals. Try to eat 6 to 8 ounces a day. Include wheat and oat bran cereals, whole-wheat muffins or toast, and corn tortillas in your meals.    Fruits. Try to eat 2 cups a day. Apples, oranges, strawberries, pears, and bananas are good sources. (Note: Fruit juice is low in fiber.)    Vegetables. Try to eat at least 2.5 cups a day. Add asparagus, carrots, broccoli, peas, and corn to your meals.    Beans. One cup of cooked lentils gives you over 15 grams of fiber. Try navy beans, lentils, and chickpeas.    Seeds. A small handful of seeds gives you about 3 grams of fiber. Try  sunflower or catrachito seeds.  Keep track of your fiber  Keep track of how much fiber you eat. Start by reading food labels. Then eat a variety of foods high in fiber. As you start to eat more fiber, ask your healthcare provider how much water you should be drinking to keep your digestive system working smoothly.  Aim for a certain amount of fiber in your diet each day. If you are a woman, that amount is between 25 and 28 grams per day. Men should aim for 30 to 33 grams per day. After age 50, your daily fiber needs drop to 22 grams for women and 28 grams for men.  Before you reach for the fiber supplements, think about this. Fiber is found naturally in healthy whole foods. It gives you that feeling of fullness after you eat. Taking fiber supplements or eating fiber-enriched foods will not give you this full feeling.  Your fiber intake is a good measure for the quality of your overall diet. If you are missing out on your daily amount of fiber, you may be lacking other important nutrients as well.  Date Last Reviewed: 6/1/2017 2000-2018 [employer]. [address] [place] [address]. All rights reserved. This information is not intended as a substitute for professional medical care. Always follow your healthcare professional's instructions.          Constipation (Adult)  Constipation means that you have bowel movements that are less frequent than usual. Stools often become very hard and difficult to pass.  Constipation is very common. At some point in life, it affects almost everyone. Since everyone's bowel habits are different, what is constipation to one person may not be to another. Your healthcare provider may do tests to diagnose constipation. It depends on what he or she finds when evaluating you.    Symptoms of constipation include:    Abdominal pain    Bloating    Vomiting    Painful bowel movements    Itching, swelling, bleeding, or pain around the anus  Causes  Constipation can have many  causes. These include:    Diet low in fiber    Too much dairy    Not drinking enough liquids    Lack of exercise or physical activity (especially true for older adults)    Changes in lifestyle or daily routine, including pregnancy, aging, work, and travel    Frequent use or misuse of laxatives    Ignoring the urge to have a bowel movement or delaying it until later    Medicines, such as certain prescription pain medicines, iron supplements, antacids, certain antidepressants, and calcium supplements    Diseases like irritable bowel syndrome, bowel obstructions, stroke, diabetes, thyroid disease, Parkinson disease, hemorrhoids, and colon cancer  Complications  Potential complications of constipation can include:    Hemorrhoids    Rectal bleeding from hemorrhoids or anal fissures (skin tears)    Hernias    Dependency on laxatives    Chronic constipation    Fecal impaction, a severe form of constipation in which a large amount of hard stool is in your rectum that you can't pass    Bowel obstruction or perforation  Home care  All treatment should be done after talking with your healthcare provider. This is especially true if you have another medical problems, are taking prescription medicines, or are an older adult. Treatment most often involves lifestyle changes. You may also need medicines. Your healthcare provider will tell you which will work best for you. Follow the advice below to help avoid this problem in the future.  Lifestyle changes  These lifestyle changes can help prevent constipation:    Diet. Eat a high-fiber diet, with fresh fruit and vegetables, and reduce dairy intake, meats, and processed foods    Fluids. It's important to get enough fluids each day. Drink plenty of water when you eat more fiber. If you are on diet that limits the amount of fluid you can have, talk about this with your healthcare provider.    Regular exercise. Check with your healthcare provider first.  Medicines  Take any medicines as  directed. Some laxatives are safe to use only every now and then. Others can be taken on a regular basis. While laxatives don't cause bowel dependence, they are treating the symptoms. So your constipation may return if you don't make other changes. Talk with your healthcare provider or pharmacist if you have questions.  Prescription pain medicines can cause constipation. If you are taking this kind of medicine, ask your healthcare provider if you should also take a stool softener.  Medicines you may take to treat constipation include:    Fiber supplements    Stool softeners    Laxatives    Enemas    Rectal suppositories  Follow-up care  Follow up with your healthcare provider if symptoms don't get better in the next few days. You may need to have more tests or see a specialist.  Call 911  Call 911 if any of these occur:    Trouble breathing    Stiff, rigid abdomen that is severely painful to touch    Confusion    Fainting or loss of consciousness    Rapid heart rate    Chest pain  When to seek medical advice  Call your healthcare provider right away if any of these occur:    Fever of 100.4 F (38 C) or higher, or as directed by your healthcare provider    Failure to resume normal bowel movements    Pain in your abdomen or back gets worse    Nausea or vomiting    Swelling in your abdomen    Blood in the stool    Black, tarry stool    Involuntary weight loss    Weakness  Date Last Reviewed: 6/1/2018 2000-2018 [employer]. [address] [place] [address], [place] [address]. All rights reserved. This information is not intended as a substitute for professional medical care. Always follow your healthcare professional's instructions.

## 2018-11-20 NOTE — ED TRIAGE NOTES
ED Nursing Triage Note (General)   ________________________________    Von Santiago is a 33 year old Male that presents to triage private car  With history of  Woke up today with abdominal pain in all quadrants.  States he felt bloated and constipated, had some hard stool and then loose stool and then had rectal bleeding.  States it was bright red blood.  Last ate at 1400 and states he is feeling weak and tired, has not had this problem in the past, reported by patient   Significant symptoms had onset 8 hour(s) ago.  /60  Pulse 89  Temp 99.5  F (37.5  C) (Tympanic)  Resp 12  Ht 1.829 m (6')  Wt 92.5 kg (204 lb)  SpO2 97%  BMI 27.67 kg/m2t  Patient appears alert , in mild distress., and cooperative behavior.      Airway: intact  Breathing noted as Normal.  Circulation Normal  Skin pale  Action taken:  Triage to critical care immediately      PRE HOSPITAL PRIOR LIVING SITUATION Spouse and Children    COLUMBIA-SUICIDE SEVERITY RATING SCALE   Screen with Triage Points for Emergency Department      Ask questions that are bolded and underlined.   Past  month   Ask Questions 1 and 2 YES NO   1)  Have you wished you were dead or wished you could go to sleep and not wake up?   x   2)  Have you actually had any thoughts of killing yourself?   x   If YES to 2, ask questions 3, 4, 5, and 6.  If NO to 2, go directly to question 6.   3)  Have you been thinking about how you might do this?   E.g.  I thought about taking an overdose but I never made a specific plan as to when where or how I would actually do it .and I would never go through with it.       4)  Have you had these thoughts and had some intention of acting on them?   As opposed to  I have the thoughts but I definitely will not do anything about them.       5)  Have you started to work out or worked out the details of how to kill yourself? Do you intend to carry out this plan?      6)  Have you ever done anything, started to do anything, or prepared  to do anything to end your life?  Examples: Collected pills, obtained a gun, gave away valuables, wrote a will or suicide note, took out pills but didn t swallow any, held a gun but changed your mind or it was grabbed from your hand, went to the roof but didn t jump; or actually took pills, tried to shoot yourself, cut yourself, tried to hang yourself, etc.    If YES, ask: Was this within the past three months?  Lifetime     x    Past 3 Months        Item 1:  Behavioral Health Referral at Discharge  Item 2:  Behavioral Health Referral at Discharge   Item 3:  Behavioral Health Consult (Psychiatric Nurse/) and consider Patient Safety Precautions  Item 4:  Immediate Notification of Physician and/or Behavioral Health and Patient Safety Precautions   Item 5:  Immediate Notification of Physician and/or Behavioral Health and Patient Safety Precautions  Item 6:  Over 3 months ago: Behavioral Health Consult (Psychiatric Nurse/) and consider Patient Safety Precautions  OR  Item 6:  3 months ago or less: Immediate Notification of Physician and/or Behavioral Health and Patient Safety Precautions

## 2018-11-20 NOTE — ED AVS SNAPSHOT
[facility]    [address]    [place] [address]    Phone:  [phone]    Fax:  [phone]                                       Von Santiago   MRN: [id number]    Department:  [facility]   Date of Visit:  11/20/2018           Patient Information     Date Of Birth          1985        Your diagnoses for this visit were:     Viral gastritis     Slow transit constipation        You were seen by Boo Holguin PA-C.      Follow-up Information     Schedule an appointment as soon as possible for a visit with No Ref-Primary, Physician.    Why:  As needed, If symptoms worsen        Discharge Instructions         Treating Constipation    Constipation is a common and often uncomfortable problem. Constipation means you have bowel movements fewer than 3 times per week, or strain to pass hard, dry stool. It can last a short time. Or it can be a problem that never seems to go away. The good news is that it can often be treated and controlled.  Eat more fiber  One of the best ways to help treat constipation is to increase your fiber intake. You can do this either through diet or by using fiber supplements. Fiber (in whole grains, fruits, and vegetables) adds bulk and absorbs water to soften the stool. This helps the stool pass through the colon more easily. When you increase your fiber intake, do it slowly to avoid side effects such as bloating. Also increase the amount of water that you drink. Eating more of the following foods can add fiber to your diet.    High-fiber cereals    Whole grains, bran, and brown rice    Vegetables such as carrots, broccoli, and greens    Fresh fruits (especially apples, pears, and dried fruits like raisins and apricots)    Nuts and legumes (especially beans such as lentils, kidney beans, and lima beans)  Get physically active  Exercise helps improve the working of your colon which helps ease constipation. Try to get some  physical activity every day. If you haven t been active for a while, talk to your healthcare provider before starting again.  Laxatives  Your healthcare provider may suggest an over-the-counter product to help ease your constipation. He or she may suggest the use of bulk-forming agents or laxatives. The use of laxatives, if used as directed, is common and safe. Follow directions carefully when using them. See your healthcare provider for new-onset constipation, or long-term constipation, to rule out other causes such as medicines or thyroid disease.  Date Last Reviewed: 7/1/2016 2000-2018 [employer]. [address] [place] [address], [place] [address]. All rights reserved. This information is not intended as a substitute for professional medical care. Always follow your healthcare professional's instructions.          Eating a High-Fiber Diet  Fiber is what gives strength and structure to plants. Most grains, beans, vegetables, and fruits contain fiber. Foods rich in fiber are often low in calories and fat, and they fill you up more. They may also reduce your risks for certain health problems. To find out the amount of fiber in canned, packaged, or frozen foods, read the Nutrition Facts label. It tells you how much fiber is in one serving.    Types of fiber and their benefits  There are two types of fiber: insoluble and soluble. They both aid digestion and help you maintain a healthy weight.    Insoluble fiber. This is found in whole grains, cereals, certain fruits and vegetables such as apple skin, corn, and carrots. Insoluble fiber may prevent constipation and reduce the risk for certain types of cancer. It is called insoluble because it does not dissolve in water.    Soluble fiber. This type of fiber is in oats, beans, and certain fruits and vegetables such as strawberries and peas. Soluble fiber can reduce cholesterol, which may help lower the risk for heart disease. It also helps control blood sugar  levels.  Look for high-fiber foods  Try these foods to add fiber to your diet:    Whole-grain breads and cereals. Try to eat 6 to 8 ounces a day. Include wheat and oat bran cereals, whole-wheat muffins or toast, and corn tortillas in your meals.    Fruits. Try to eat 2 cups a day. Apples, oranges, strawberries, pears, and bananas are good sources. (Note: Fruit juice is low in fiber.)    Vegetables. Try to eat at least 2.5 cups a day. Add asparagus, carrots, broccoli, peas, and corn to your meals.    Beans. One cup of cooked lentils gives you over 15 grams of fiber. Try navy beans, lentils, and chickpeas.    Seeds. A small handful of seeds gives you about 3 grams of fiber. Try sunflower or catrachito seeds.  Keep track of your fiber  Keep track of how much fiber you eat. Start by reading food labels. Then eat a variety of foods high in fiber. As you start to eat more fiber, ask your healthcare provider how much water you should be drinking to keep your digestive system working smoothly.  Aim for a certain amount of fiber in your diet each day. If you are a woman, that amount is between 25 and 28 grams per day. Men should aim for 30 to 33 grams per day. After age 50, your daily fiber needs drop to 22 grams for women and 28 grams for men.  Before you reach for the fiber supplements, think about this. Fiber is found naturally in healthy whole foods. It gives you that feeling of fullness after you eat. Taking fiber supplements or eating fiber-enriched foods will not give you this full feeling.  Your fiber intake is a good measure for the quality of your overall diet. If you are missing out on your daily amount of fiber, you may be lacking other important nutrients as well.  Date Last Reviewed: 6/1/2017 2000-2018 [employer]. [address] [place] [address], [place] [address]. All rights reserved. This information is not intended as a substitute for professional medical care. Always follow your healthcare professional's  instructions.          Constipation (Adult)  Constipation means that you have bowel movements that are less frequent than usual. Stools often become very hard and difficult to pass.  Constipation is very common. At some point in life, it affects almost everyone. Since everyone's bowel habits are different, what is constipation to one person may not be to another. Your healthcare provider may do tests to diagnose constipation. It depends on what he or she finds when evaluating you.    Symptoms of constipation include:    Abdominal pain    Bloating    Vomiting    Painful bowel movements    Itching, swelling, bleeding, or pain around the anus  Causes  Constipation can have many causes. These include:    Diet low in fiber    Too much dairy    Not drinking enough liquids    Lack of exercise or physical activity (especially true for older adults)    Changes in lifestyle or daily routine, including pregnancy, aging, work, and travel    Frequent use or misuse of laxatives    Ignoring the urge to have a bowel movement or delaying it until later    Medicines, such as certain prescription pain medicines, iron supplements, antacids, certain antidepressants, and calcium supplements    Diseases like irritable bowel syndrome, bowel obstructions, stroke, diabetes, thyroid disease, Parkinson disease, hemorrhoids, and colon cancer  Complications  Potential complications of constipation can include:    Hemorrhoids    Rectal bleeding from hemorrhoids or anal fissures (skin tears)    Hernias    Dependency on laxatives    Chronic constipation    Fecal impaction, a severe form of constipation in which a large amount of hard stool is in your rectum that you can't pass    Bowel obstruction or perforation  Home care  All treatment should be done after talking with your healthcare provider. This is especially true if you have another medical problems, are taking prescription medicines, or are an older adult. Treatment most often involves  lifestyle changes. You may also need medicines. Your healthcare provider will tell you which will work best for you. Follow the advice below to help avoid this problem in the future.  Lifestyle changes  These lifestyle changes can help prevent constipation:    Diet. Eat a high-fiber diet, with fresh fruit and vegetables, and reduce dairy intake, meats, and processed foods    Fluids. It's important to get enough fluids each day. Drink plenty of water when you eat more fiber. If you are on diet that limits the amount of fluid you can have, talk about this with your healthcare provider.    Regular exercise. Check with your healthcare provider first.  Medicines  Take any medicines as directed. Some laxatives are safe to use only every now and then. Others can be taken on a regular basis. While laxatives don't cause bowel dependence, they are treating the symptoms. So your constipation may return if you don't make other changes. Talk with your healthcare provider or pharmacist if you have questions.  Prescription pain medicines can cause constipation. If you are taking this kind of medicine, ask your healthcare provider if you should also take a stool softener.  Medicines you may take to treat constipation include:    Fiber supplements    Stool softeners    Laxatives    Enemas    Rectal suppositories  Follow-up care  Follow up with your healthcare provider if symptoms don't get better in the next few days. You may need to have more tests or see a specialist.  Call 911  Call 911 if any of these occur:    Trouble breathing    Stiff, rigid abdomen that is severely painful to touch    Confusion    Fainting or loss of consciousness    Rapid heart rate    Chest pain  When to seek medical advice  Call your healthcare provider right away if any of these occur:    Fever of 100.4 F (38 C) or higher, or as directed by your healthcare provider    Failure to resume normal bowel movements    Pain in your abdomen or back gets  worse    Nausea or vomiting    Swelling in your abdomen    Blood in the stool    Black, tarry stool    Involuntary weight loss    Weakness  Date Last Reviewed: 6/1/2018 2000-2018 [employer]. [address] [place] [address], [place] [address]. All rights reserved. This information is not intended as a substitute for professional medical care. Always follow your healthcare professional's instructions.          24 Hour Appointment Hotline     To schedule an appointment at [facility], please call [phone]. If you don't have a family doctor or clinic, we will help you find one. [facility] are conveniently located to serve the needs of you and your family.           Review of your medicines      START taking        Dose / Directions Last dose taken    citrate of magnesia 1.745 GM/30ML Soln   Dose:  1 Bottle   Quantity:  1 Bottle        Take 1 Bottle by mouth daily for 1 day   Refills:  0        docusate sodium 100 MG tablet   Commonly known as:  COLACE   Dose:  100 mg   Quantity:  10 tablet        Take 1 tablet (100 mg) by mouth 2 times daily   Refills:  1        metoclopramide 5 MG tablet   Commonly known as:  REGLAN   Dose:  10 mg   Quantity:  20 tablet        Take 2 tablets (10 mg) by mouth 3 times daily as needed (nausea)   Refills:  0          Our records show that you are taking the medicines listed below. If these are incorrect, please call your family doctor or clinic.        Dose / Directions Last dose taken    GABAPENTIN PO        Refills:  0        UNABLE TO FIND        MEDICATION NAME: Medical Marijuana   Refills:  0                Prescriptions were sent or printed at these locations (3 Prescriptions)                   [facility] [address] [place] [address] - [address]   [address] [facility] [address]    Telephone:  [phone]   Fax:  [phone]   Hours:                  Printed at Department/Unit printer (3 of 3)         docusate sodium  "(COLACE) 100 MG tablet               Magnesium Citrate (CITRATE OF MAGNESIA) 1.745 GM/30ML SOLN               metoclopramide (REGLAN) 5 MG tablet                Procedures and tests performed during your visit     *UA reflex to Microscopic    CBC with platelets differential    Comprehensive metabolic panel    D-Dimer ([facility])    Magnesium    XR Abdomen 2 Views      Orders Needing Specimen Collection     None      Pending Results     No orders found from 2018 to 2018.            Pending Culture Results     No orders found from 2018 to 2018.            Pending Results Instructions     If you had any lab results that were not finalized at the time of your Discharge, you can call the ED Lab Result RN at [phone]. You will be contacted by this team for any positive Lab results or changes in treatment. The nurses are available 7 days a week from 10A to 6:30P.  You can leave a message 24 hours per day and they will return your call.        Thank you for choosing [facility]       Thank you for choosing [facility] for your care. Our goal is always to provide you with excellent care. Hearing back from our patients is one way we can continue to improve our services. Please take a few minutes to complete the written survey that you may receive in the mail after you visit with us. Thank you!        [employer] Information     [employer] lets you send messages to your doctor, view your test results, renew your prescriptions, schedule appointments and more. To sign up, go to [email or web address] . Click on \"Log in\" on the left side of the screen, which will take you to the Welcome page. Then click on \"Sign up Now\" on the right side of the page.     You will be asked to enter the access code listed below, as well as some personal information. Please follow the directions to create your username and password.     Your access code is: [email or web address]  Expires: 2018  2:40 PM     Your access code will  in 90 " days. If you need help or a new code, please call your [place] clinic or [phone].        Care EveryWhere ID     This is your Care EveryWhere ID. This could be used by other organizations to access your [place] medical records  [id number]        Equal Access to Services     GAIL REED : Pat Juarez, waaxda luqadaha, qaybta kaalmabret crum, venessa hernandez. So [facility] [phone].    ATENCIÓN: Si habla español, tiene a palomino disposición servicios gratuitos de asistencia lingüística. Llame al [phone].    We comply with applicable federal civil rights laws and [place] laws. We do not discriminate on the basis of race, color, national origin, age, disability, sex, sexual orientation, or gender identity.            After Visit Summary       This is your record. Keep this with you and show to your community pharmacist(s) and doctor(s) at your next visit.

## 2018-11-20 NOTE — ED AVS SNAPSHOT
[facility]    [address]    [place] [address]    Phone:  [phone]    Fax:  [phone]                                       Von Santiago   MRN: [id number]    Department:  [facility]   Date of Visit:  11/20/2018           After Visit Summary Signature Page     I have received my discharge instructions, and my questions have been answered. I have discussed any challenges I see with this plan with the nurse or doctor.    ..........................................................................................................................................  Patient/Patient Representative Signature      ..........................................................................................................................................  Patient Representative Print Name and Relationship to Patient    ..................................................               ................................................  Date                                   Time    ..........................................................................................................................................  Reviewed by Signature/Title    ...................................................              ..............................................  Date                                               Time          22EPIC Rev 08/18

## 2018-11-21 NOTE — ED PROVIDER NOTES
History     Chief Complaint   Patient presents with     Abdominal Pain     HPI Comments: This is a 33-year-old male who reports today he has felt increasing abdominal bloatedness.  He denies any nausea or vomiting.  He has been able to eat and drink without issues.  He reports he has been passing gas.  He did have some hard stool today followed by some loose stool afterwards he had some rectal bleeding.  Denies any lightheadedness or dizziness.  Denies any nausea or vomiting.  No fever or chills.  Denies any previous abdominal surgeries.    The history is provided by the patient.     Problem List:    Patient Active Problem List    Diagnosis Date Noted     Esophageal reflux 02/14/2012     Priority: Medium        Past Medical History:    Past Medical History:   Diagnosis Date     Activity, other specified      Anxiety disorder      Cannabis dependence, uncomplicated (H)      Chondromalacia      Major depressive disorder, single episode      Meningitis        Past Surgical History:    Past Surgical History:   Procedure Laterality Date     OTHER SURGICAL HISTORY      44854.0,PAST SURGICAL HISTORY,Unremarkable       Family History:    Family History   Problem Relation Age of Onset     Other - See Comments Mother      Mother with hepatitis C at time of his birth.     Diabetes Father      Diabetes       Social History:  Marital Status:   [2]  Social History   Substance Use Topics     Smoking status: Current Every Day Smoker     Packs/day: 0.50     Years: 21.00     Types: Cigarettes     Smokeless tobacco: Never Used     Alcohol use Yes      Comment: Alcoholic Drinks/day: occasionally        Medications:      docusate sodium (COLACE) 100 MG tablet   GABAPENTIN PO   Magnesium Citrate (CITRATE OF MAGNESIA) 1.745 GM/30ML SOLN   metoclopramide (REGLAN) 5 MG tablet   UNABLE TO FIND         Review of Systems   Constitutional: Negative for appetite change, chills, fatigue and fever.   Gastrointestinal: Positive for  abdominal pain and diarrhea. Negative for nausea and vomiting.   Neurological: Positive for weakness. Negative for dizziness and light-headedness.       Physical Exam   BP: 113/60  Pulse: 89  Temp: 99.5  F (37.5  C)  Resp: 12  Height: 182.9 cm (6')  Weight: 92.5 kg (204 lb)  SpO2: 97 %  Lying Orthostatic BP: 125/69  Lying Orthostatic Pulse: 71 bpm  Sitting Orthostatic BP: 114/71  Sitting Orthostatic Pulse: 84 bpm  Standing Orthostatic BP: 108/70  Standing Orthostatic Pulse: 86 bpm      Physical Exam   Constitutional: He is oriented to person, place, and time. No distress.   HENT:   Head: Atraumatic.   Mouth/Throat: Oropharynx is clear and moist. No oropharyngeal exudate.   Eyes: Pupils are equal, round, and reactive to light. No scleral icterus.   Cardiovascular: Normal heart sounds and intact distal pulses.    Pulmonary/Chest: Breath sounds normal. No respiratory distress.   Abdominal: Soft. Bowel sounds are normal. There is no tenderness.   Generalized abdominal pain.  No distention.  No rebound or masses.  Bowel sounds are normal.   Musculoskeletal: He exhibits no edema or tenderness.   Neurological: He is alert and oriented to person, place, and time.   Skin: Skin is warm. No rash noted. He is not diaphoretic.   Psychiatric: He has a normal mood and affect. His behavior is normal.       ED Course     ED Course     Procedures             Results for orders placed or performed during the hospital encounter of 11/20/18 (from the past 24 hour(s))   *UA reflex to Microscopic   Result Value Ref Range    Color Urine Yellow     Appearance Urine Clear     Glucose Urine Negative NEG^Negative mg/dL    Bilirubin Urine Negative NEG^Negative    Ketones Urine Negative NEG^Negative mg/dL    Specific Gravity Urine 1.020 1.000 - 1.030    Blood Urine Negative NEG^Negative    pH Urine 7.0 5.0 - 9.0 pH    Protein Albumin Urine Negative NEG^Negative mg/dL    Urobilinogen Urine 0.2 0.2 - 1.0 EU/dL    Nitrite Urine Negative  NEG^Negative    Leukocyte Esterase Urine Negative NEG^Negative    Source Midstream Urine    CBC with platelets differential   Result Value Ref Range    WBC 11.4 (H) 4.0 - 11.0 10e9/L    RBC Count 4.89 4.4 - 5.9 10e12/L    Hemoglobin 14.4 13.3 - 17.7 g/dL    Hematocrit 41.3 40.0 - 53.0 %    MCV 85 78 - 100 fl    MCH 29.4 26.5 - 33.0 pg    MCHC 34.9 31.5 - 36.5 g/dL    RDW 13.4 10.0 - 15.0 %    Platelet Count 264 150 - 450 10e9/L    Diff Method Automated Method     % Neutrophils 74.3 %    % Lymphocytes 18.4 %    % Monocytes 4.7 %    % Eosinophils 1.8 %    % Basophils 0.4 %    % Immature Granulocytes 0.4 %    Absolute Neutrophil 8.5 (H) 1.6 - 8.3 10e9/L    Absolute Lymphocytes 2.1 0.8 - 5.3 10e9/L    Absolute Monocytes 0.5 0.0 - 1.3 10e9/L    Absolute Eosinophils 0.2 0.0 - 0.7 10e9/L    Absolute Basophils 0.1 0.0 - 0.2 10e9/L    Abs Immature Granulocytes 0.1 0 - 0.4 10e9/L   D-Dimer (HI,GH)   Result Value Ref Range    D-Dimer ng/mL <200 0 - 230 ng/ml D-DU   Comprehensive metabolic panel   Result Value Ref Range    Sodium 141 134 - 144 mmol/L    Potassium 3.7 3.5 - 5.1 mmol/L    Chloride 108 (H) 98 - 107 mmol/L    Carbon Dioxide 26 21 - 31 mmol/L    Anion Gap 7 3 - 14 mmol/L    Glucose 115 (H) 70 - 105 mg/dL    Urea Nitrogen 19 7 - 25 mg/dL    Creatinine 1.01 0.70 - 1.30 mg/dL    GFR Estimate 84 >60 mL/min/1.7m2    GFR Estimate If Black >90 >60 mL/min/1.7m2    Calcium 9.5 8.6 - 10.3 mg/dL    Bilirubin Total 0.6 0.3 - 1.0 mg/dL    Albumin 4.1 3.5 - 5.7 g/dL    Protein Total 6.8 6.4 - 8.9 g/dL    Alkaline Phosphatase 49 34 - 104 U/L    ALT 23 7 - 52 U/L    AST 19 13 - 39 U/L   Magnesium   Result Value Ref Range    Magnesium 2.0 1.9 - 2.7 mg/dL   XR Abdomen 2 Views    Narrative    PROCEDURE:  XR ABDOMEN 2 VW    HISTORY:  diarrhea;.    TECHNIQUE:  AP and supine radiographs of the abdomen.    COMPARISON:  None.    FINDINGS:     The lung bases are clear. No subdiaphragmatic air is seen.    No dilated loops of small bowel or  air-fluid levels are seen.      Impression    IMPRESSION:    No obstruction or free air.    ANTHONY OLSON MD       Medications   ondansetron (ZOFRAN-ODT) ODT tab 4 mg (4 mg Oral Given 11/20/18 1656)   famotidine (PEPCID) tablet 20 mg (20 mg Oral Given 11/20/18 1656)   magnesium citrate solution 296 mL (296 mLs Oral Given 11/20/18 1759)   docusate sodium (COLACE) capsule 200 mg (200 mg Oral Given 11/20/18 1759)   metoclopramide (REGLAN) tablet 10 mg (10 mg Oral Given 11/20/18 1759)       Assessments & Plan (with Medical Decision Making)     I have reviewed the nursing notes.    I have reviewed the findings, diagnosis, plan and need for follow up with the patient.      New Prescriptions    DOCUSATE SODIUM (COLACE) 100 MG TABLET    Take 1 tablet (100 mg) by mouth 2 times daily    MAGNESIUM CITRATE (CITRATE OF MAGNESIA) 1.745 GM/30ML SOLN    Take 1 Bottle by mouth daily for 1 day    METOCLOPRAMIDE (REGLAN) 5 MG TABLET    Take 2 tablets (10 mg) by mouth 3 times daily as needed (nausea)       Final diagnoses:   Viral gastritis   Slow transit constipation   Rectal bleeding   External hemorrhoids   Low grade fever 99.5.  Vital signs stable.  Generalized abdominal pain.  Frequent loose stools and some rectal bleeding.  He denies need for IV fluids as he has been eating and drinking adequately.  He was given oral Zofran and Pepcid.  CBC shows minimal elevation of white blood cells 11.4.  Minimal elevation of neutrophils at 8.5 as well.  Was unremarkable.  CMP is normal.  UA is normal.  Magnesium is 2.0.  D-dimer is normal.  Abdominal x-rays show no obstruction or free air.  Does have a moderate amount of fecal retention.  Constipation.  Viral gastritis.  Rectal bleeding from hemorrhoids.  I discussed further evaluation versus continued monitoring.  He would like to treat his symptoms and follow-up if there is any concerns.  He was given mag citrate, Colace, and Reglan.  Discussed increase fluid and fiber intake.   Discussed continued monitoring and return for any concerns.  Denies the need for a work note.  Rx for Colace twice daily times 5 days, mag citrate which she will fill and take tomorrow if no good bowel movement by morning.  And Reglan.  If there is any concerns for further evaluation as needed.    11/20/2018   [facility] \[facility] [place] [facility]\""     Boo Holguin PA-C  11/20/18 1924

## 2018-12-30 ENCOUNTER — HOSPITAL ENCOUNTER (EMERGENCY)
Facility: OTHER | Age: 33
Discharge: LEFT BEFORE DISCHARGE | End: 2018-12-30
Payer: COMMERCIAL

## 2018-12-30 VITALS
DIASTOLIC BLOOD PRESSURE: 59 MMHG | BODY MASS INDEX: 27.09 KG/M2 | OXYGEN SATURATION: 96 % | TEMPERATURE: 98.4 F | HEIGHT: 72 IN | WEIGHT: 200 LBS | SYSTOLIC BLOOD PRESSURE: 124 MMHG | RESPIRATION RATE: 16 BRPM

## 2018-12-30 RX ORDER — IBUPROFEN 800 MG/1
800 TABLET, FILM COATED ORAL
COMMUNITY
Start: 2017-12-12 | End: 2019-01-07

## 2018-12-30 SDOH — HEALTH STABILITY: MENTAL HEALTH: HOW OFTEN DO YOU HAVE A DRINK CONTAINING ALCOHOL?: NEVER

## 2018-12-30 ASSESSMENT — MIFFLIN-ST. JEOR: SCORE: 1880.19

## 2018-12-30 NOTE — ED TRIAGE NOTES
Pt arrives to the ED via private car.  Pt states that he  Has been coughing for the past three days and is hard to catch his breath.  Pt reports having heartburn when this started.  Pt reports not being able to sleep due to the coughing.

## 2019-01-07 ENCOUNTER — OFFICE VISIT (OUTPATIENT)
Dept: FAMILY MEDICINE | Facility: OTHER | Age: 34
End: 2019-01-07
Attending: FAMILY MEDICINE
Payer: COMMERCIAL

## 2019-01-07 VITALS
DIASTOLIC BLOOD PRESSURE: 69 MMHG | RESPIRATION RATE: 18 BRPM | BODY MASS INDEX: 26.56 KG/M2 | OXYGEN SATURATION: 97 % | HEART RATE: 86 BPM | WEIGHT: 195.8 LBS | SYSTOLIC BLOOD PRESSURE: 112 MMHG

## 2019-01-07 DIAGNOSIS — Z72.0 TOBACCO ABUSE: ICD-10-CM

## 2019-01-07 DIAGNOSIS — Z83.3 FHX: DIABETES MELLITUS: ICD-10-CM

## 2019-01-07 DIAGNOSIS — J20.9 ACUTE BRONCHITIS, UNSPECIFIED ORGANISM: ICD-10-CM

## 2019-01-07 DIAGNOSIS — Z00.00 ANNUAL PHYSICAL EXAM: Primary | ICD-10-CM

## 2019-01-07 LAB
CHOLEST SERPL-MCNC: 154 MG/DL
HBA1C MFR BLD: 5.3 % (ref 4–6)
HDLC SERPL-MCNC: 43 MG/DL (ref 23–92)
LDLC SERPL CALC-MCNC: 85 MG/DL
NONHDLC SERPL-MCNC: 111 MG/DL
TRIGL SERPL-MCNC: 129 MG/DL

## 2019-01-07 PROCEDURE — G0463 HOSPITAL OUTPT CLINIC VISIT: HCPCS

## 2019-01-07 PROCEDURE — 99395 PREV VISIT EST AGE 18-39: CPT | Performed by: FAMILY MEDICINE

## 2019-01-07 PROCEDURE — 80061 LIPID PANEL: CPT | Performed by: FAMILY MEDICINE

## 2019-01-07 PROCEDURE — 36415 COLL VENOUS BLD VENIPUNCTURE: CPT | Performed by: FAMILY MEDICINE

## 2019-01-07 PROCEDURE — 83036 HEMOGLOBIN GLYCOSYLATED A1C: CPT | Performed by: FAMILY MEDICINE

## 2019-01-07 RX ORDER — ALBUTEROL SULFATE 90 UG/1
2 AEROSOL, METERED RESPIRATORY (INHALATION) EVERY 6 HOURS
Qty: 1 INHALER | Refills: 0 | Status: SHIPPED | OUTPATIENT
Start: 2019-01-07 | End: 2020-02-21

## 2019-01-07 RX ORDER — GABAPENTIN 300 MG/1
3 CAPSULE ORAL 3 TIMES DAILY
Refills: 2 | COMMUNITY
Start: 2018-12-16 | End: 2019-01-07

## 2019-01-07 ASSESSMENT — ANXIETY QUESTIONNAIRES
GAD7 TOTAL SCORE: 1
3. WORRYING TOO MUCH ABOUT DIFFERENT THINGS: NOT AT ALL
7. FEELING AFRAID AS IF SOMETHING AWFUL MIGHT HAPPEN: NOT AT ALL
2. NOT BEING ABLE TO STOP OR CONTROL WORRYING: NOT AT ALL
IF YOU CHECKED OFF ANY PROBLEMS ON THIS QUESTIONNAIRE, HOW DIFFICULT HAVE THESE PROBLEMS MADE IT FOR YOU TO DO YOUR WORK, TAKE CARE OF THINGS AT HOME, OR GET ALONG WITH OTHER PEOPLE: SOMEWHAT DIFFICULT
5. BEING SO RESTLESS THAT IT IS HARD TO SIT STILL: NOT AT ALL
1. FEELING NERVOUS, ANXIOUS, OR ON EDGE: NOT AT ALL
6. BECOMING EASILY ANNOYED OR IRRITABLE: SEVERAL DAYS

## 2019-01-07 ASSESSMENT — PATIENT HEALTH QUESTIONNAIRE - PHQ9
5. POOR APPETITE OR OVEREATING: NOT AT ALL
SUM OF ALL RESPONSES TO PHQ QUESTIONS 1-9: 1

## 2019-01-07 ASSESSMENT — PAIN SCALES - GENERAL: PAINLEVEL: NO PAIN (0)

## 2019-01-07 NOTE — LETTER
January 8, 2019      Von Santiago  [address] [address]        Dear ,    We are writing to inform you of your test results. The A1c is normal, so you do not have diabetes or prediabetes. Cholesterol values look good. We would recheck cholesterol in 5 years since you have good values.     As we discussed, eating a diet with a lot of vegetables, fruits, nuts and beans and less meat and processed food will help prevent many future health problems. Quitting smoking would be an excellent step to improving your long-term health as well.      Resulted Orders   Hemoglobin A1c   Result Value Ref Range    Hemoglobin A1C 5.3 4.0 - 6.0 %   Lipid Panel   Result Value Ref Range    Cholesterol 154 <200 mg/dL    Triglycerides 129 <150 mg/dL    HDL Cholesterol 43 23 - 92 mg/dL    LDL Cholesterol Calculated 85 <100 mg/dL      Comment:      Desirable:       <100 mg/dl    Non HDL Cholesterol 111 <130 mg/dL       If you have any questions or concerns, please call the clinic at the number listed above.       Sincerely,        Otis Funez MD

## 2019-01-07 NOTE — NURSING NOTE
Pt presents to clinic today for annual physical. Pt states concerns about hard time breathing x 1.5 week. Pt denies any chest pain.      Medication Reconciliation: complete  Jojo Finley LPN

## 2019-01-07 NOTE — PROGRESS NOTES
SUBJECTIVE:   CC: Von Santiago is an 33 year old male who presents for preventative health visit.     Healthy Habits:    Do you get at least three servings of calcium containing foods daily (dairy, green leafy vegetables, etc.)? yes    Amount of exercise or daily activities, outside of work: daily activity with kids    Problems taking medications regularly No    Medication side effects: No    Have you had an eye exam in the past two years? yes    Do you see a dentist twice per year? yes    Do you have sleep apnea, excessive snoring or daytime drowsiness?no     If you drink alcohol do you typically have >3 drinks per day or >7 drinks per week? No       Today's PHQ-2 Score:   PHQ-9 SCORE 1/7/2019   PHQ-9 Total Score 1         Other problems:   SOB for a week. Hard to catch breath. No fever now, but for 1 day did. Nonproductive cough. He is not getting worse, but not improving. Minor sinus or nasal congestion.    Smoking 1/2 ppd. Tried nicotine patches and gum without success in the past. Is down to 2 cigarettes currently.    Past Medical History:   Diagnosis Date     Activity, other specified     Mother with hepatitis C at time of his birth.     Anxiety disorder     No Comments Provided     Cannabis dependence, uncomplicated (H)     No Comments Provided     Chondromalacia     Bilateral knee discomfort,     Major depressive disorder, single episode     No Comments Provided     Meningitis     as an infant        Past Surgical History:   Procedure Laterality Date     OTHER SURGICAL HISTORY      46504.0,PAST SURGICAL HISTORY,Unremarkable          Current Outpatient Medications   Medication Sig Dispense Refill     UNABLE TO FIND MEDICATION NAME: Medical Marijuana       No Known Allergies    Social History     Tobacco Use     Smoking status: Current Every Day Smoker     Packs/day: 0.50     Years: 21.00     Pack years: 10.50     Types: Cigarettes     Smokeless tobacco: Never Used   Substance Use Topics     Alcohol  use: No     Frequency: Never     Comment: Alcoholic Drinks/day: occasionally        Reviewed and updated as needed this visit by clinical staff  Tobacco  Allergies  Meds  Med Hx  Surg Hx  Fam Hx  Soc Hx        Reviewed and updated as needed this visit by Provider            ROS:   General: Denies general constitutional problems  Eyes: Denies problems  Ears/Nose/Throat: Denies problems  Cardiovascular: Denies problems  Respiratory: see above  Gastrointestinal: Denies problems  Genitourinary: Denies problems  Musculoskeletal: Denies problems  Skin: Denies problems  Neurologic: Denies problems  Psychiatric: anxiety, on medical cannibis      OBJECTIVE:   /69 (BP Location: Right arm, Patient Position: Chair, Cuff Size: Adult Large)   Pulse 86   Resp 18   Wt 88.8 kg (195 lb 12.8 oz)   SpO2 97%   BMI 26.56 kg/m    EXAM:  General Appearance: Pleasant, alert, appropriate appearance for age. No acute distress  Eye Exam:  Normal external eye, conjunctiva, lids, cornea. GUILLERMO.  Ear Exam: Normal TM's bilaterally. Normal auditory canals and external ears.  OroPharynx Exam:  Dental hygiene adequate. Normal buccal mucosa. Normal pharynx.  Neck Exam:  Supple, no masses or nodes. No carotid bruits.  Thyroid Exam: No nodules or enlargement.  Chest/Respiratory Exam: Normal chest wall and respirations. Clear to auscultation.  Cardiovascular Exam: Regular rate and rhythm. S1, S2, no murmur, click, gallop, or rubs.  Gastrointestinal Exam: Soft, non-tender, no masses or organomegaly.  Musculoskeletal Exam: Back is straight and non-tender, full ROM of upper and lower extremities.  Foot Exam: Left and right foot: good pedal pulses.  Skin: no rash or abnormalities  Neurologic Exam: Nonfocal, symmetric DTRs, normal gross motor, tone coordination and no tremor.  Psychiatric Exam: Alert and oriented - appropriate affect.    Results for orders placed or performed in visit on 01/07/19   Hemoglobin A1c   Result Value Ref Range     Hemoglobin A1C 5.3 4.0 - 6.0 %   Lipid Panel   Result Value Ref Range    Cholesterol 154 <200 mg/dL    Triglycerides 129 <150 mg/dL    HDL Cholesterol 43 23 - 92 mg/dL    LDL Cholesterol Calculated 85 <100 mg/dL    Non HDL Cholesterol 111 <130 mg/dL             ASSESSMENT/PLAN:       ICD-10-CM    1. Annual physical exam Z00.00 Lipid Panel     Lipid Panel   2. FHx: diabetes mellitus Z83.3 Hemoglobin A1c     Hemoglobin A1c   3. Acute bronchitis, unspecified organism J20.9 albuterol (PROAIR HFA/PROVENTIL HFA/VENTOLIN HFA) 108 (90 Base) MCG/ACT inhaler   4. Tobacco abuse Z72.0 nicotine (NICORETTE) 2 MG gum     By history, illness sounds like bronchitis.  Exam was normal.  Discussed potential treatments.  Given albuterol inhaler to use 3-4 times daily until improving and then wean off.      COUNSELING:  Reviewed preventive health counseling, as reflected in patient instructions       Regular exercise       Healthy diet/nutrition       Immunizations    current       Lipid screen returned normal, recheck in 5 years   FHx of diabetes, not fasting, so obtained A1c which is normal    Reviewed orders with patient. Reviewed health maintenance and updated orders accordingly - Yes    BP Readings from Last 1 Encounters:   01/07/19 112/69     Estimated body mass index is 26.56 kg/m  as calculated from the following:    Height as of 12/30/18: 1.829 m (6').    Weight as of this encounter: 88.8 kg (195 lb 12.8 oz).    Blood pressure is good    Weight management plan: Discussed healthy diet and exercise guidelines     reports that he has been smoking cigarettes.  He has a 10.50 pack-year smoking history. he has never used smokeless tobacco.  Reviewed available options for smoking cessation. Patient chose to use nicotine gum 2 mg twice daily for 1-2 months, then wean off. Discussed appropriate use of medication. Performed tobacco cessation counseling for greater than 3 minutes.        Otis Funez MD  [facility]

## 2019-01-07 NOTE — PATIENT INSTRUCTIONS
Use albuterol inhaler 3 to 4 times daily as needed for the next 1-2 weeks.   Wean off the inhaler as you improve  Return if worse, especially more shortness of breath and fever  If no improvement in a month tops, then return    Quitting smoking would be best thing for health  Try nicotine gum for smoking cessation a couple times daily. Use for a couple months before quitting gum    Letter with lab results

## 2019-01-09 ASSESSMENT — ANXIETY QUESTIONNAIRES: GAD7 TOTAL SCORE: 1

## 2019-03-09 ENCOUNTER — HOSPITAL ENCOUNTER (OUTPATIENT)
Dept: GENERAL RADIOLOGY | Facility: OTHER | Age: 34
Discharge: HOME | End: 2019-03-09
Attending: NURSE PRACTITIONER | Admitting: NURSE PRACTITIONER
Payer: COMMERCIAL

## 2019-03-09 ENCOUNTER — OFFICE VISIT (OUTPATIENT)
Dept: FAMILY MEDICINE | Facility: OTHER | Age: 34
End: 2019-03-09
Attending: NURSE PRACTITIONER
Payer: COMMERCIAL

## 2019-03-09 VITALS
DIASTOLIC BLOOD PRESSURE: 70 MMHG | BODY MASS INDEX: 27.36 KG/M2 | TEMPERATURE: 98.7 F | SYSTOLIC BLOOD PRESSURE: 118 MMHG | RESPIRATION RATE: 16 BRPM | WEIGHT: 201.7 LBS | OXYGEN SATURATION: 98 % | HEART RATE: 98 BPM

## 2019-03-09 DIAGNOSIS — M79.601 PAIN OF RIGHT UPPER EXTREMITY: ICD-10-CM

## 2019-03-09 DIAGNOSIS — S50.11XA CONTUSION OF RIGHT FOREARM, INITIAL ENCOUNTER: Primary | ICD-10-CM

## 2019-03-09 PROCEDURE — 73090 X-RAY EXAM OF FOREARM: CPT | Mod: TC,RT

## 2019-03-09 PROCEDURE — G0463 HOSPITAL OUTPT CLINIC VISIT: HCPCS | Mod: 25

## 2019-03-09 PROCEDURE — G0463 HOSPITAL OUTPT CLINIC VISIT: HCPCS

## 2019-03-09 PROCEDURE — 99213 OFFICE O/P EST LOW 20 MIN: CPT | Performed by: NURSE PRACTITIONER

## 2019-03-09 ASSESSMENT — ENCOUNTER SYMPTOMS
MYALGIAS: 1
ARTHRALGIAS: 1
CONSTITUTIONAL NEGATIVE: 1

## 2019-03-09 ASSESSMENT — PAIN SCALES - GENERAL: PAINLEVEL: SEVERE PAIN (6)

## 2019-03-10 NOTE — PROGRESS NOTES
SUBJECTIVE:   Von Santiago is a 33 year old male who presents to clinic today for the following health issues:    HPI  Patient presents with right forearm pain that started after he fell today.  Patient reports he slipped on the ice landing on his arm hitting the concrete.  Has bruising and limited range of motion.  Has not taken anything for pain.  No shoulder pain, no wrist pain.    Patient Active Problem List    Diagnosis Date Noted     Esophageal reflux 02/14/2012     Priority: Medium     Past Medical History:   Diagnosis Date     Activity, other specified     Mother with hepatitis C at time of his birth.     Anxiety disorder     No Comments Provided     Cannabis dependence, uncomplicated (H)     No Comments Provided     Chondromalacia     Bilateral knee discomfort,     Major depressive disorder, single episode     No Comments Provided     Meningitis     as an infant      Past Surgical History:   Procedure Laterality Date     OTHER SURGICAL HISTORY      Unremarkable     Family History   Problem Relation Age of Onset     Other - See Comments Mother         Mother with hepatitis C at time of his birth.     Hyperlipidemia Mother      Diabetes Father      Heart Disease Maternal Grandfather      Colon Cancer No family hx of      Prostate Cancer No family hx of      Social History     Tobacco Use     Smoking status: Current Every Day Smoker     Packs/day: 0.50     Years: 21.00     Pack years: 10.50     Types: Cigarettes     Smokeless tobacco: Never Used   Substance Use Topics     Alcohol use: No     Frequency: Never     Social History     Social History Narrative    .  Has 3 children. 1 with his current wife.  Unemployed.     Current Outpatient Medications   Medication Sig Dispense Refill     albuterol (PROAIR HFA/PROVENTIL HFA/VENTOLIN HFA) 108 (90 Base) MCG/ACT inhaler Inhale 2 puffs into the lungs every 6 hours 1 Inhaler 0     nicotine (NICORETTE) 2 MG gum Place 1 each (2 mg) inside cheek 2 times  daily as needed for smoking cessation 60 each 1     UNABLE TO FIND MEDICATION NAME: Medical Marijuana       No Known Allergies    Review of Systems   Constitutional: Negative.    Musculoskeletal: Positive for arthralgias and myalgias.   Skin: Negative.         OBJECTIVE:     /70 (BP Location: Left arm, Patient Position: Sitting, Cuff Size: Adult Regular)   Pulse 98   Temp 98.7  F (37.1  C) (Tympanic)   Resp 16   Wt 91.5 kg (201 lb 11.2 oz)   SpO2 98%   BMI 27.36 kg/m    Body mass index is 27.36 kg/m .  Physical Exam   Constitutional: He appears well-developed and well-nourished. No distress.   HENT:   Head: Normocephalic.   Musculoskeletal:        Right shoulder: Normal.        Right wrist: Normal.        Right forearm: He exhibits tenderness and swelling. He exhibits no deformity and no laceration.        Arms:       Right hand: Normal.   Increased pain with supination and pronation of the right forearm.   Neurological: He is alert.   Skin: Skin is warm and dry. He is not diaphoretic.   Nursing note and vitals reviewed.      Diagnostic Test Results:  No results found for this or any previous visit (from the past 24 hour(s)).    Right forearm XR: I personally reviewed the x-rays and there is no fracture or dislocation. Radiology review pending and nurse will notify patient if there is any change in the treatment plan.      ASSESSMENT/PLAN:       ICD-10-CM    1. Contusion of right forearm, initial encounter S50.11XA    2. Pain of right upper extremity M79.601 XR Forearm Right 2 Views     X-rays performed due to mechanism of injury and limited range of motion.  X-rays are negative for acute fracture. Advised ice, rest and ibuprofen as needed.    Follow-up with primary care if not improving or symptoms worsen.  Given epic educational materials.      Yuliana Morel NP  [facility]

## 2019-03-10 NOTE — PATIENT INSTRUCTIONS
Patient Education     Upper Extremity Contusion  You have a contusion (bruise) of an upper extremity (arm, wrist, hand, or fingers). Symptoms include pain, swelling, and skin discoloration. No bones are broken. This injury may take from a few days to a few weeks to heal.  During that time, the bruise may change from reddish in color, to purple-blue, to green-yellow, to yellow-brown.  Home care    Unless another medicine was prescribed, you can take acetaminophen, ibuprofen, or naproxen to control pain. (If you have chronic liver or kidney disease or ever had a stomach ulcer or gastrointestinal bleeding, talk with your doctor before using these medicines.)    Elevate the injured area to reduce pain and swelling. As much as possible, sit or lie down with the injured area raised about the level of your heart. This is especially important during the first 48 hours.    Ice the injured area to help reduce pain and swelling. Wrap a cold source (ice pack or ice cubes in a plastic bag) in a thin towel. Apply to the bruised area for 20 minutes every 1 to 2 hours the first day. Continue this 3 to 4 times a day until the pain and swelling goes away.    If a sling was provided, you may remove it to shower or bathe. To prevent joint stiffness, do not wear it for more than 1 week.  Follow-up care  Follow up with your healthcare provide, or as advised. Call if you are not improving within the next 1 to 2 weeks.  When to seek medical advice   Call your healthcare provider right away if any of these occur:    Increased pain or swelling    Hand or fingers become cold, blue, numb or tingly    Signs of infection: Warmth, drainage, or increased redness or pain around the injury    Inability to move the injured body part     Frequent bruising for unknown reasons  Date Last Reviewed: 2/1/2017 2000-2018 [employer] [address] [facility], [place] [address]. All rights reserved. This information is not intended as a  substitute for professional medical care. Always follow your healthcare professional's instructions.

## 2019-03-10 NOTE — NURSING NOTE
Chief Complaint   Patient presents with     Musculoskeletal Problem       Medication Reconciliation: complete   Patient presents with right arm pain. Patient fell on ice onto concrete. Melissa Deutsch LPN .............3/9/2019  6:37 PM

## 2019-04-08 ENCOUNTER — TELEPHONE (OUTPATIENT)
Dept: FAMILY MEDICINE | Facility: OTHER | Age: 34
End: 2019-04-08

## 2019-04-08 ENCOUNTER — OFFICE VISIT (OUTPATIENT)
Dept: FAMILY MEDICINE | Facility: OTHER | Age: 34
End: 2019-04-08
Attending: NURSE PRACTITIONER
Payer: COMMERCIAL

## 2019-04-08 DIAGNOSIS — N50.89 LUMP IN THE TESTICLE: Primary | ICD-10-CM

## 2019-04-08 PROCEDURE — G0463 HOSPITAL OUTPT CLINIC VISIT: HCPCS | Performed by: NURSE PRACTITIONER

## 2019-04-08 PROCEDURE — 99212 OFFICE O/P EST SF 10 MIN: CPT | Performed by: NURSE PRACTITIONER

## 2019-04-08 ASSESSMENT — ENCOUNTER SYMPTOMS
DYSURIA: 0
CONSTITUTIONAL NEGATIVE: 1

## 2019-04-08 NOTE — TELEPHONE ENCOUNTER
Spoke with patient and discussed with him that Dr. Funez has a full schedule today and if it can't wait till tomorrow, rapid clinic is always available.   Montse Spring ....................  4/8/2019   12:48 PM

## 2019-04-08 NOTE — TELEPHONE ENCOUNTER
PBI- Patient called stating he found a lump on his testicle. He made an appointment for 04/09/19. But he is concerned and wants to know if he should come in today to be seen.  He does not currently have a PCP but has seen Dr Funez in the past and requested a note be sent to him.     Idania Crum on 4/8/2019 at 12:38 PM

## 2019-04-09 ENCOUNTER — OFFICE VISIT (OUTPATIENT)
Dept: FAMILY MEDICINE | Facility: OTHER | Age: 34
End: 2019-04-09
Attending: FAMILY MEDICINE
Payer: COMMERCIAL

## 2019-04-09 VITALS
RESPIRATION RATE: 18 BRPM | TEMPERATURE: 98 F | HEART RATE: 80 BPM | DIASTOLIC BLOOD PRESSURE: 68 MMHG | BODY MASS INDEX: 27.67 KG/M2 | WEIGHT: 204 LBS | SYSTOLIC BLOOD PRESSURE: 102 MMHG

## 2019-04-09 DIAGNOSIS — N50.82 PAIN IN SCROTUM: Primary | ICD-10-CM

## 2019-04-09 DIAGNOSIS — A63.0 GENITAL WARTS: ICD-10-CM

## 2019-04-09 DIAGNOSIS — B35.6 TINEA CRURIS: ICD-10-CM

## 2019-04-09 LAB
ALBUMIN UR-MCNC: ABNORMAL MG/DL
APPEARANCE UR: ABNORMAL
BILIRUB UR QL STRIP: ABNORMAL
COLOR UR AUTO: YELLOW
GLUCOSE UR STRIP-MCNC: NEGATIVE MG/DL
HGB UR QL STRIP: NEGATIVE
KETONES UR STRIP-MCNC: 15 MG/DL
LEUKOCYTE ESTERASE UR QL STRIP: NEGATIVE
MUCOUS THREADS #/AREA URNS LPF: PRESENT /LPF
NITRATE UR QL: NEGATIVE
NON-SQ EPI CELLS #/AREA URNS LPF: ABNORMAL /LPF
PH UR STRIP: 6 PH (ref 5–9)
RBC #/AREA URNS AUTO: ABNORMAL /HPF
SOURCE: ABNORMAL
SP GR UR STRIP: >1.03 (ref 1–1.03)
UROBILINOGEN UR STRIP-ACNC: 1 EU/DL (ref 0.2–1)
WBC #/AREA URNS AUTO: ABNORMAL /HPF

## 2019-04-09 PROCEDURE — 81001 URINALYSIS AUTO W/SCOPE: CPT | Performed by: FAMILY MEDICINE

## 2019-04-09 PROCEDURE — 54056 CRYOSURGERY PENIS LESION(S): CPT | Performed by: FAMILY MEDICINE

## 2019-04-09 PROCEDURE — G0463 HOSPITAL OUTPT CLINIC VISIT: HCPCS | Mod: 25

## 2019-04-09 PROCEDURE — 17110 DESTRUCTION B9 LES UP TO 14: CPT | Performed by: FAMILY MEDICINE

## 2019-04-09 PROCEDURE — 99214 OFFICE O/P EST MOD 30 MIN: CPT | Mod: 25 | Performed by: FAMILY MEDICINE

## 2019-04-09 PROCEDURE — 87086 URINE CULTURE/COLONY COUNT: CPT | Performed by: FAMILY MEDICINE

## 2019-04-09 PROCEDURE — G0463 HOSPITAL OUTPT CLINIC VISIT: HCPCS

## 2019-04-09 RX ORDER — CLOTRIMAZOLE 1 %
CREAM (GRAM) TOPICAL 2 TIMES DAILY
Qty: 30 G | Refills: 0 | Status: SHIPPED | OUTPATIENT
Start: 2019-04-09 | End: 2019-04-24

## 2019-04-09 ASSESSMENT — ANXIETY QUESTIONNAIRES
2. NOT BEING ABLE TO STOP OR CONTROL WORRYING: NOT AT ALL
6. BECOMING EASILY ANNOYED OR IRRITABLE: NEARLY EVERY DAY
IF YOU CHECKED OFF ANY PROBLEMS ON THIS QUESTIONNAIRE, HOW DIFFICULT HAVE THESE PROBLEMS MADE IT FOR YOU TO DO YOUR WORK, TAKE CARE OF THINGS AT HOME, OR GET ALONG WITH OTHER PEOPLE: VERY DIFFICULT
GAD7 TOTAL SCORE: 18
5. BEING SO RESTLESS THAT IT IS HARD TO SIT STILL: NEARLY EVERY DAY
1. FEELING NERVOUS, ANXIOUS, OR ON EDGE: NEARLY EVERY DAY
3. WORRYING TOO MUCH ABOUT DIFFERENT THINGS: NEARLY EVERY DAY
7. FEELING AFRAID AS IF SOMETHING AWFUL MIGHT HAPPEN: NEARLY EVERY DAY

## 2019-04-09 ASSESSMENT — PATIENT HEALTH QUESTIONNAIRE - PHQ9
SUM OF ALL RESPONSES TO PHQ QUESTIONS 1-9: 20
5. POOR APPETITE OR OVEREATING: NEARLY EVERY DAY

## 2019-04-09 ASSESSMENT — PAIN SCALES - GENERAL: PAINLEVEL: NO PAIN (0)

## 2019-04-09 NOTE — PATIENT INSTRUCTIONS
Testing urine for infection  Ultrasound to check for infection or varicose veins  We will let you know results when complete  Potentially need antibiotics for infection     Treat rash on left scrotum with clotrimazole twice daily for 1-2 weeks    Froze genital wart    Return if not better

## 2019-04-09 NOTE — PROGRESS NOTES
SUBJECTIVE:   Von Santiago is a 33 year old male who presents to clinic today for the following health issues:    HPI  Patient presents to [facility] clinic with concerns of a lump that he found last night on his testicle.  No urinary symptoms, denies pain, has not noticed this in the past.  Has an appointment scheduled with internal medicine tomorrow.    Patient Active Problem List    Diagnosis Date Noted     Esophageal reflux 02/14/2012     Priority: Medium     Past Medical History:   Diagnosis Date     Activity, other specified     Mother with hepatitis C at time of his birth.     Anxiety disorder     No Comments Provided     Cannabis dependence, uncomplicated (H)     No Comments Provided     Chondromalacia     Bilateral knee discomfort,     Major depressive disorder, single episode     No Comments Provided     Meningitis     as an infant      Past Surgical History:   Procedure Laterality Date     OTHER SURGICAL HISTORY      Unremarkable     Family History   Problem Relation Age of Onset     Other - See Comments Mother         Mother with hepatitis C at time of his birth.     Hyperlipidemia Mother      Diabetes Father      Heart Disease Maternal Grandfather      Colon Cancer No family hx of      Prostate Cancer No family hx of      Social History     Tobacco Use     Smoking status: Current Every Day Smoker     Packs/day: 0.50     Years: 21.00     Pack years: 10.50     Types: Cigarettes     Smokeless tobacco: Never Used   Substance Use Topics     Alcohol use: No     Frequency: Never     Social History     Social History Narrative    .  Has 3 children. 1 with his current wife.  Unemployed.     Current Outpatient Medications   Medication Sig Dispense Refill     albuterol (PROAIR HFA/PROVENTIL HFA/VENTOLIN HFA) 108 (90 Base) MCG/ACT inhaler Inhale 2 puffs into the lungs every 6 hours 1 Inhaler 0     nicotine (NICORETTE) 2 MG gum Place 1 each (2 mg) inside cheek 2 times daily as needed for smoking cessation  60 each 1     UNABLE TO FIND MEDICATION NAME: Medical Marijuana       No Known Allergies    Review of Systems   Constitutional: Negative.    Genitourinary: Positive for scrotal swelling. Negative for decreased urine volume, dysuria, penile pain, penile swelling and testicular pain.        OBJECTIVE:     There were no vitals taken for this visit.  There is no height or weight on file to calculate BMI.  Physical Exam   Constitutional: He is oriented to person, place, and time. He appears well-developed and well-nourished.   Cardiovascular: Normal rate.   Pulmonary/Chest: Effort normal.   Musculoskeletal: Normal range of motion.   Neurological: He is alert and oriented to person, place, and time.   Nursing note and vitals reviewed.      Diagnostic Test Results:  none     ASSESSMENT/PLAN:       ICD-10-CM    1. Lump in the testicle N50.9      PLAN:  Patient presented to rapid clinic having anxiety related to the symptoms related to the testicular lump he found yesterday.  Reassurance given, advised that he needs to be seen in the clinic tomorrow as any testing would need to be ordered and followed by primary care. Encouraged to keep his appointment.  10 minutes was spent with patient all of which was on counseling and coordination of care.  Disclaimer:  This note consists of words and symbols derived from keyboarding, dictation, or using voice recognition software. As a result, there may be errors in the script that have gone undetected. Please consider this when interpreting information found in this note.      ELEAZAR Villareal, NP-C  4/8/2019 at 9:35 PM  [facility] AND [facility]

## 2019-04-09 NOTE — NURSING NOTE
Pt presents to clinic today for a lump on his left testicle that he notified 2 day.    Medication Reconciliation: complete  Jojo Finley LPN

## 2019-04-09 NOTE — PROGRESS NOTES
Nursing Notes:   Jojo Finley LPN  4/9/2019 11:08 AM  Sign at exiting of workspace  Pt presents to clinic today for a lump on his left testicle that he notified 2 day.    Medication Reconciliation: complete  Jojo Finley LPN     SUBJECTIVE:  33 year old male presents for a testicular lump on the left side present 2 days ago. [facility] to [facility], but no exam was completed. Told to follow up today as scheduled. He cannot find the lump today. Is anxious and worried about cancer.  The lump was not painful.  , is monogamous.     Has some sporadic pain in the right scrotum radiating into the abdomen present for a while. During scrotal exam on the right side it replicated his pain.     Notes a rash on both sides of scrotum.  Worried about HPV causing the rash or the other symptoms as his wife has HPV. She has genital warts and he has one on the left penis base.      REVIEW OF SYSTEMS:    Pertinent items are noted in HPI.    Current Outpatient Medications   Medication Sig Dispense Refill     albuterol (PROAIR HFA/PROVENTIL HFA/VENTOLIN HFA) 108 (90 Base) MCG/ACT inhaler Inhale 2 puffs into the lungs every 6 hours 1 Inhaler 0     UNABLE TO FIND MEDICATION NAME: Medical Marijuana       No Known Allergies    OBJECTIVE:  /68   Pulse 80   Temp 98  F (36.7  C) (Tympanic)   Resp 18   Wt 92.5 kg (204 lb)   BMI 27.67 kg/m      EXAM:  General Appearance: Alert. No acute distress  : 2 small warts on left penis shaft base. Irritated, minimally erythematous patch on left scrotum, seems like tinea  No hernias. Tenderness in right scrotum, seems like varicoceles. No testicular lumps on either side.   Psychiatric: Anxious affect      ASSESSMENT/PLAN:    ICD-10-CM    1. Pain in scrotum N50.82 US Testicular and Scrotum     Urinalysis w Reflex Microscopic If Positive     Urine Culture Aerobic Bacterial     Urine Microscopic   2. Genital warts A63.0 DESTRUCT BENIGN LESION, UP TO 14   3. Tinea cruris  B35.6 clotrimazole (LOTRIMIN) 1 % external cream       Reassured there are no masses like cancer. He has tenderness, could have epididymitis. Offered ultrasound for reassurance and assessment.     UA and UCx to see if abnormal and aids in potential epididymitis diagnosis. He declines STD testing, is monogamous and wife tested negative during childbirth.    Desires treatment for genital warts.  PROCEDURE: Reviewed risks and benefits of cryotherapy. After informed consent was obtained, patient elected to proceed. Performed cryotherapy to 2 lesions x 3 freeze/thaw cycles. Return for signs of infection.    Treat tinea with clotrimazole twice daily until resolved, plus 2 days      F/U pending lab and imaging    Greater than 50% of this 37 minute appointment spent on counseling.    Otis Funez MD    This document was prepared using a combination of typing and voice generated software.  While every attempt was made for accuracy, spelling and grammatical errors may exist.

## 2019-04-10 ENCOUNTER — HOSPITAL ENCOUNTER (OUTPATIENT)
Dept: ULTRASOUND IMAGING | Facility: OTHER | Age: 34
Discharge: HOME | End: 2019-04-10
Attending: FAMILY MEDICINE | Admitting: FAMILY MEDICINE
Payer: COMMERCIAL

## 2019-04-10 DIAGNOSIS — N50.82 PAIN IN SCROTUM: ICD-10-CM

## 2019-04-10 PROCEDURE — 76870 US EXAM SCROTUM: CPT

## 2019-04-10 ASSESSMENT — ANXIETY QUESTIONNAIRES: GAD7 TOTAL SCORE: 18

## 2019-04-11 LAB
BACTERIA SPEC CULT: NORMAL
SPECIMEN SOURCE: NORMAL

## 2019-09-05 ENCOUNTER — APPOINTMENT (OUTPATIENT)
Dept: GENERAL RADIOLOGY | Facility: OTHER | Age: 34
End: 2019-09-05
Attending: PHYSICIAN ASSISTANT
Payer: COMMERCIAL

## 2019-09-05 ENCOUNTER — HOSPITAL ENCOUNTER (EMERGENCY)
Facility: OTHER | Age: 34
Discharge: HOME | End: 2019-09-05
Attending: PHYSICIAN ASSISTANT | Admitting: PHYSICIAN ASSISTANT
Payer: COMMERCIAL

## 2019-09-05 VITALS
BODY MASS INDEX: 27.8 KG/M2 | DIASTOLIC BLOOD PRESSURE: 80 MMHG | WEIGHT: 198.6 LBS | OXYGEN SATURATION: 96 % | TEMPERATURE: 98.1 F | RESPIRATION RATE: 16 BRPM | SYSTOLIC BLOOD PRESSURE: 131 MMHG | HEIGHT: 71 IN

## 2019-09-05 DIAGNOSIS — S60.012A CONTUSION OF LEFT THUMB: ICD-10-CM

## 2019-09-05 DIAGNOSIS — M79.645 THUMB PAIN, LEFT: ICD-10-CM

## 2019-09-05 PROCEDURE — 99282 EMERGENCY DEPT VISIT SF MDM: CPT | Mod: Z6 | Performed by: PHYSICIAN ASSISTANT

## 2019-09-05 PROCEDURE — 73130 X-RAY EXAM OF HAND: CPT | Mod: LT

## 2019-09-05 PROCEDURE — 99283 EMERGENCY DEPT VISIT LOW MDM: CPT | Performed by: PHYSICIAN ASSISTANT

## 2019-09-05 ASSESSMENT — ENCOUNTER SYMPTOMS
HEMATURIA: 0
FEVER: 0
CONFUSION: 0
ADENOPATHY: 0
WOUND: 0
ABDOMINAL PAIN: 0
CHILLS: 0
CHEST TIGHTNESS: 0
SHORTNESS OF BREATH: 0
BACK PAIN: 0
BRUISES/BLEEDS EASILY: 0

## 2019-09-05 ASSESSMENT — MIFFLIN-ST. JEOR: SCORE: 1850.03

## 2019-09-05 NOTE — ED AVS SNAPSHOT
[facility]  [address]  [place] [address]  Phone:  [phone]  Fax:  [phone]                                    Von Santiago   MRN: [id number]    Department:  [facility]   Date of Visit:  9/5/2019           After Visit Summary Signature Page    I have received my discharge instructions, and my questions have been answered. I have discussed any challenges I see with this plan with the nurse or doctor.    ..........................................................................................................................................  Patient/Patient Representative Signature      ..........................................................................................................................................  Patient Representative Print Name and Relationship to Patient    ..................................................               ................................................  Date                                   Time    ..........................................................................................................................................  Reviewed by Signature/Title    ...................................................              ..............................................  Date                                               Time          22EPIC Rev 08/18

## 2019-09-06 NOTE — ED TRIAGE NOTES
Pt arrives top the ED with left hand injury.  Pt reports opening a trailer and the a table leaf fell on the palm of his hand at approx. 1800 tonight.  Pt didn't think it was that big of an issue until he tried doing push-ups and had a lot of pain with it and he was also worried about the tendon in his hand.

## 2019-09-06 NOTE — ED PROVIDER NOTES
History     Chief Complaint   Patient presents with     Hand Injury     This is a 33-year-old male who was opening a close trailer when a oak leaf for a table came flying out.  The patient attempted to catch this and he did however he had a crush injury to his left thumb and hand area.  He is right-hand dominant.  He is concerned for fracture and is here for further evaluation at this time.  Denies any other injuries.            Allergies:  No Known Allergies    Problem List:    Patient Active Problem List    Diagnosis Date Noted     Esophageal reflux 02/14/2012     Priority: Medium        Past Medical History:    Past Medical History:   Diagnosis Date     Activity, other specified      Anxiety disorder      Cannabis dependence, uncomplicated (H)      Chondromalacia      Major depressive disorder, single episode      Meningitis        Past Surgical History:    Past Surgical History:   Procedure Laterality Date     OTHER SURGICAL HISTORY      Unremarkable       Family History:    Family History   Problem Relation Age of Onset     Other - See Comments Mother         Mother with hepatitis C at time of his birth.     Hyperlipidemia Mother      Diabetes Father      Heart Disease Maternal Grandfather      Colon Cancer No family hx of      Prostate Cancer No family hx of        Social History:  Marital Status:   [2]  Social History     Tobacco Use     Smoking status: Current Every Day Smoker     Packs/day: 0.50     Years: 21.00     Pack years: 10.50     Types: Cigarettes     Smokeless tobacco: Never Used   Substance Use Topics     Alcohol use: No     Frequency: Never     Drug use: Yes     Types: Marijuana     Comment: medical        Medications:      albuterol (PROAIR HFA/PROVENTIL HFA/VENTOLIN HFA) 108 (90 Base) MCG/ACT inhaler   UNABLE TO FIND         Review of Systems   Constitutional: Negative for chills and fever.   HENT: Negative for congestion.    Eyes: Negative for visual disturbance.   Respiratory:  "Negative for chest tightness and shortness of breath.    Cardiovascular: Negative for chest pain.   Gastrointestinal: Negative for abdominal pain.   Genitourinary: Negative for hematuria.   Musculoskeletal: Negative for back pain.        Left hand and thumb injury and pain   Skin: Negative for rash and wound.   Neurological: Negative for syncope.   Hematological: Negative for adenopathy. Does not bruise/bleed easily.   Psychiatric/Behavioral: Negative for confusion.       Physical Exam   BP: 131/80  Heart Rate: 135  Temp: 98.1  F (36.7  C)  Resp: 16  Height: 179.1 cm (5' 10.5\")  Weight: 90.1 kg (198 lb 9.6 oz)  SpO2: 96 %      Physical Exam   Constitutional: He is oriented to person, place, and time. He appears well-developed and well-nourished. No distress.   HENT:   Head: Normocephalic and atraumatic.   Eyes: Conjunctivae are normal. No scleral icterus.   Neck: Neck supple.   Cardiovascular: Normal rate and regular rhythm.   Pulmonary/Chest: Effort normal and breath sounds normal.   Abdominal: Soft. There is no tenderness.   Musculoskeletal: Normal range of motion. He exhibits tenderness. He exhibits no deformity.   The base of the patient's left thumb has significant bruising and ecchymosis.  He does have full flexion extension with this thumb however with some pain.  He can make a fist with his hands he has good cap refill neurologically he is intact.   Lymphadenopathy:     He has no cervical adenopathy.   Neurological: He is alert and oriented to person, place, and time.   Skin: Skin is warm and dry. Capillary refill takes less than 2 seconds. No rash noted. He is not diaphoretic.   Psychiatric: He has a normal mood and affect.       ED Course        Procedures           Results for orders placed or performed during the hospital encounter of 09/05/19 (from the past 24 hour(s))   XR Hand Left G/E 3 Views    Narrative    Exam: XR HAND LT G/E 3 VW     History:Male, age 33 years, hand injury    Comparison:  " None    Technique: Three views are submitted.    Findings: Bones are normally mineralized. No evidence of acute or  subacute fracture.  No evidence of dislocation.           Impression    Impression:  No evidence of acute or subacute bony abnormality.    ALISA MENON MD       Medications - No data to display    Assessments & Plan (with Medical Decision Making)     I have reviewed the nursing notes.    I have reviewed the findings, diagnosis, plan and need for follow up with the patient.      Discharge Medication List as of 9/5/2019  9:22 PM          Final diagnoses:   Contusion of left thumb   Thumb pain, left     Afebrile.  Vital signs stable.  Left thumb and hand crush injury with significant contusion to his left thumb area.  Left hand x-rays show no obvious fracture or deformity.  I discussed using rice to reduce swelling and pain.  He will use over-the-counter Tylenol Motrin for pain relief.  Follow-up with his primary care provider if symptoms persist for further evaluation as needed.  9/5/2019   [facility]     Boo Holguin PA-C  09/05/19 [id number]

## 2020-01-15 DIAGNOSIS — M25.511 RIGHT SHOULDER PAIN, UNSPECIFIED CHRONICITY: Primary | ICD-10-CM

## 2020-01-20 ENCOUNTER — OFFICE VISIT (OUTPATIENT)
Dept: ORTHOPEDICS | Facility: OTHER | Age: 35
End: 2020-01-20
Attending: ORTHOPAEDIC SURGERY
Payer: COMMERCIAL

## 2020-01-20 ENCOUNTER — HOSPITAL ENCOUNTER (OUTPATIENT)
Dept: GENERAL RADIOLOGY | Facility: OTHER | Age: 35
Discharge: HOME | End: 2020-01-20
Attending: ORTHOPAEDIC SURGERY | Admitting: ORTHOPAEDIC SURGERY
Payer: COMMERCIAL

## 2020-01-20 DIAGNOSIS — M25.511 RIGHT SHOULDER PAIN, UNSPECIFIED CHRONICITY: ICD-10-CM

## 2020-01-20 DIAGNOSIS — Z00.00 ROUTINE GENERAL MEDICAL EXAMINATION AT A HEALTH CARE FACILITY: Primary | ICD-10-CM

## 2020-01-20 PROCEDURE — 73030 X-RAY EXAM OF SHOULDER: CPT | Mod: RT

## 2020-01-20 PROCEDURE — G0463 HOSPITAL OUTPT CLINIC VISIT: HCPCS

## 2020-01-20 PROCEDURE — G0463 HOSPITAL OUTPT CLINIC VISIT: HCPCS | Mod: 25

## 2020-01-27 DIAGNOSIS — Z72.0 TOBACCO ABUSE: Primary | ICD-10-CM

## 2020-01-28 NOTE — PROGRESS NOTES
VITALS:   Height:   72  Weight:   187  Pulse rate:  76  Blood Pressure:  120 / 80      CHIEF COMPLAINT: Right Shoulder Pain    PROBLEMS:   Patient has no noted problems.    PATIENT REPORTED MEDICATIONS:  * CANNABIS     PATIENT REPORTED ALLERGIES:  * TAB SPRING SOAP (CriticalReaction: No reaction details noted)    RISK FACTORS:  Tobacco use:   current every day smoker  Alcohol Use:   No    HISTORY OF PRESENT ILLNESS:    He states that he has had right shoulder pain going back for about 2-3 years.  It can get all the way up to an 8/10.  It is sharp.  When he moves his shoulder a certain way or when he lies on it is when it bothers him.  Nothing at all makes it better.  It is basically just pain.  He is able to do anything he wants to do within the limits of his pain.    The patient's health history form dated 1/20/2020 was reviewed and signed.     FAMILY HISTORY:    Father:  Diabetes  Mother:  Heart Problems    SOCIAL HISTORY:     Maintenance  Alcohol Use:  No  Tobacco Use:  Yes, Wants to Quit  HIV/Hepatitis:  No    REVIEW OF SYSTEMS:  Joint or Muscle pain: Yes  Stiffness:  Yes  Swelling:  No  Difficulty in walking: No  Cold extremities: No  Weakness of muscles: No  Rash or Itching: No  Bruising:  No  Numbness/Tingling: Yes    PHYSICAL EXAMINATION:    General:  The patient is alert and oriented x3 and in no acute distress and cooperative during the physical exam with a normal mood and affect.    Skin:  Intact over the right shoulder; no erythema, warmth, rashes or bruising.    Cardiovascular:  Normal capillary refill of the right upper extremity with a palpable radial pulse.  No evidence of upper extremity DVT.    Neurologic:  Normal sensation and motor function in the median, radial and ulnar distributions.     Musculoskeletal:  On examination today he has full range of motion of the shoulder all the way up to 170 degrees, external rotation 45 degrees, a little bit of pain at the end range of motion.   Internal rotation to L5 which causes a significant amount of pain.  He is nontender to palpation at Codman's point.  Markedly tender to palpation at the AC joint.  Nontender to palpation of the bicipital groove.  Testing of the rotator cuff reveals some pain with supraspinatus, infraspinatus, and teres minor testing.  He is otherwise neuro and vascularly intact.  He has good strength in the rotator cuff in all planes.  Positive cross-body adduction test with loading.    X-RAY:  X-ray examination shows well-maintained acromiohumeral distance.  No significant glenohumeral arthritis.  There is significant osteoarthritis noted of the AC joint with possible cystic change at the end of the clavicle.    ASSESSMENT:    This is a 34-year-old gentleman with acromioclavicular joint arthritis of his right shoulder.    PROCEDURES:   Risks, benefits and alternatives were reviewed with the patient.  After informed consent was obtained, under sterile technique after an alcohol prep 20 mg of Kenalog and 10 mg of lidocaine were injected in the right AC joint using a superior approach.  The procedure was tolerated well.     PLAN:   He was given an injection today and this will treat his pain hopefully indefinitely, but probably just for awhile.  If he has return of the pain then will plan a subacromial decompression, distal clavicle excision under arthroscopy for the right shoulder.    Dictated by:  Mike Silva MD  Copy to:  Otis Funez MD, MD at [facility]    D:  01/20/2020  T:   01/27/2020    Typed and/or reviewed and corrected by signing  below, and sent to the Physician for final review and signature.      This report was created using voice recording software and computer-generated templates. Although every effort has been made to review for and eliminate errors, some errors may still occur.

## 2020-01-28 NOTE — TELEPHONE ENCOUNTER
"Patient returned call, and his last name and  were verified. Pt was reminded of the information noted below. Pt states he is not wanting a refill of the gum, stating \"it was really gross.\" Pt is \"really trying to quit right now, and down to 5 cigarettes per day.\" Pt is requesting prescription for patches. Pt has not used in the past.     Pt transferred to scheduling line, to set up appointment for annual physical. Pt requesting prescription before he is seen.    Next 5 appointments (look out 90 days)    2020 12:40 PM CST  PHYSICAL with Otis Funez MD  [facility] ([facility]) [address]  [place] [address] [phone]        Will route to PCP, for consideration of prescription for nicotine patch.     Refill request for gum refused, with note to pharmacy. Unable to complete prescription refill per RN Medication Refill Policy. Preethi Colunga RN .............. 2020  12:03 PM    "

## 2020-01-28 NOTE — TELEPHONE ENCOUNTER
Kiki MALDONADO sent Rx request for the following:      NICOTINE 2MG UNCTD MINT GUM 110S   Sig: PLACE 1 PIECE INSIDE CHEEK 2 TIMES DAILY AS NEEDED FOR SMOKING CESSATION  Last Prescription Date:   1/7/19  Last Fill Qty/Refills:         60, R-1 (End: 4/9/19)    Last Office Visit:                  4/9/19 (Acute issues)    1/7/19 (Annual Physical)  Future Office visit:           None.      Partial Cholinergic Nicotinic Agonist Agents Failed1/28 10:51 AM   Medication is active on med list     Discontinued 4/9/19; no reason noted.    Attempted reaching Patient, for more information. Left message on machine to call back. Preethi Colunga RN .............. 1/28/2020  11:02 AM

## 2020-02-21 ENCOUNTER — OFFICE VISIT (OUTPATIENT)
Dept: FAMILY MEDICINE | Facility: OTHER | Age: 35
End: 2020-02-21
Attending: FAMILY MEDICINE
Payer: COMMERCIAL

## 2020-02-21 VITALS
OXYGEN SATURATION: 99 % | TEMPERATURE: 97.8 F | RESPIRATION RATE: 19 BRPM | HEART RATE: 86 BPM | WEIGHT: 188.6 LBS | BODY MASS INDEX: 26.68 KG/M2 | SYSTOLIC BLOOD PRESSURE: 126 MMHG | DIASTOLIC BLOOD PRESSURE: 82 MMHG

## 2020-02-21 DIAGNOSIS — M75.41 IMPINGEMENT SYNDROME OF SHOULDER REGION, RIGHT: ICD-10-CM

## 2020-02-21 DIAGNOSIS — Z00.00 ROUTINE GENERAL MEDICAL EXAMINATION AT A HEALTH CARE FACILITY: Primary | ICD-10-CM

## 2020-02-21 DIAGNOSIS — Z87.891 PERSONAL HISTORY OF TOBACCO USE, PRESENTING HAZARDS TO HEALTH: ICD-10-CM

## 2020-02-21 PROCEDURE — 99395 PREV VISIT EST AGE 18-39: CPT | Performed by: FAMILY MEDICINE

## 2020-02-21 PROCEDURE — G0463 HOSPITAL OUTPT CLINIC VISIT: HCPCS

## 2020-02-21 RX ORDER — ALBUTEROL SULFATE 90 UG/1
2 AEROSOL, METERED RESPIRATORY (INHALATION) EVERY 6 HOURS
Qty: 1 INHALER | Refills: 0 | Status: SHIPPED | OUTPATIENT
Start: 2020-02-21 | End: 2021-11-23

## 2020-02-21 ASSESSMENT — PAIN SCALES - GENERAL: PAINLEVEL: NO PAIN (0)

## 2020-02-21 ASSESSMENT — PATIENT HEALTH QUESTIONNAIRE - PHQ9: SUM OF ALL RESPONSES TO PHQ QUESTIONS 1-9: 3

## 2020-02-21 NOTE — PATIENT INSTRUCTIONS
Quitting smoking is the best single thing you could do to improve long-term health    Vegetables, nuts, beans, fruits, and whole grains will keep you in better health, so try and incorporate more of those in your diet and less meat, starches, and sugars.   Gradually increase vegetable consumption and reduce meat and carbohydrate portions. Goal is half of plate should be fruits or vegetables with remainder meat and carbohydrates.   Could watch my Doc Talk - Food is Medicine at [email or web address]    Referral for physical therapy on the shoulder        Preventive Health Recommendations  Male Ages 26 - 39    Yearly exam:             See your health care provider every year in order to  o   Review health changes.   o   Discuss preventive care.    o   Review your medicines if your doctor has prescribed any.    You should be tested each year for STDs (sexually transmitted diseases), if you re at risk.     After age 35, talk to your provider about cholesterol testing. If you are at risk for heart disease, have your cholesterol tested at least every 5 years.     If you are at risk for diabetes, you should have a diabetes test (fasting glucose).  Shots: Get a flu shot each year. Get a tetanus shot every 10 years.     Nutrition:    Eat at least 5 servings of fruits and vegetables daily.     Eat whole-grain bread, whole-wheat pasta and brown rice instead of white grains and rice.     Get adequate Calcium and Vitamin D.     Lifestyle    Exercise for at least 150 minutes a week (30 minutes a day, 5 days a week). This will help you control your weight and prevent disease.     Limit alcohol to one drink per day.     No smoking.     Wear sunscreen to prevent skin cancer.     See your dentist every six months for an exam and cleaning.

## 2020-02-21 NOTE — NURSING NOTE
Pt presents to clinic today for annual physical.      Medication Reconciliation: complete  Jojo Finley LPN
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
## 2020-03-06 ENCOUNTER — OFFICE VISIT (OUTPATIENT)
Dept: FAMILY MEDICINE | Facility: OTHER | Age: 35
End: 2020-03-06
Attending: PHYSICIAN ASSISTANT

## 2020-03-06 VITALS
OXYGEN SATURATION: 94 % | BODY MASS INDEX: 27.64 KG/M2 | HEIGHT: 70 IN | RESPIRATION RATE: 18 BRPM | HEART RATE: 92 BPM | DIASTOLIC BLOOD PRESSURE: 78 MMHG | SYSTOLIC BLOOD PRESSURE: 124 MMHG | WEIGHT: 193.1 LBS | TEMPERATURE: 98 F

## 2020-03-06 DIAGNOSIS — J02.9 ACUTE VIRAL PHARYNGITIS: Primary | ICD-10-CM

## 2020-03-06 DIAGNOSIS — J02.9 SORETHROAT: ICD-10-CM

## 2020-03-06 LAB
SPECIMEN SOURCE: NORMAL
STREP GROUP A PCR: NOT DETECTED

## 2020-03-06 PROCEDURE — 87651 STREP A DNA AMP PROBE: CPT | Mod: ZL | Performed by: NURSE PRACTITIONER

## 2020-03-06 PROCEDURE — G0463 HOSPITAL OUTPT CLINIC VISIT: HCPCS

## 2020-03-06 PROCEDURE — 99213 OFFICE O/P EST LOW 20 MIN: CPT | Performed by: NURSE PRACTITIONER

## 2020-03-06 ASSESSMENT — PAIN SCALES - GENERAL: PAINLEVEL: SEVERE PAIN (6)

## 2020-03-06 ASSESSMENT — MIFFLIN-ST. JEOR: SCORE: 1812.15

## 2020-03-07 NOTE — PROGRESS NOTES
"HPI:    Von Santiago is a 34 year old male  who presents to clinic today for strep test    Sore throat for the past 4 days, progressively worsening, mostly on right side.  Right side of neck feels swollen and pain is radiating up and down jaw line and neck to ear.  Headache and body aches started today.  No fevers or chills.  Mild nasal congestion.  Minimal cough from smoking.  No shortness of breath.    Appetite at baseline.  Energy decreased today.  No OTC medications  No known strep exposure but states he has been around a lot of kids.         Past Medical History:   Diagnosis Date     Activity, other specified     Mother with hepatitis C at time of his birth.     Anxiety disorder     No Comments Provided     Cannabis dependence, uncomplicated (H)     No Comments Provided     Chondromalacia     Bilateral knee discomfort,     Major depressive disorder, single episode     No Comments Provided     Meningitis     as an infant     Past Surgical History:   Procedure Laterality Date     OTHER SURGICAL HISTORY      Unremarkable     Social History     Tobacco Use     Smoking status: Current Every Day Smoker     Packs/day: 0.50     Years: 21.00     Pack years: 10.50     Types: Cigarettes     Smokeless tobacco: Never Used   Substance Use Topics     Alcohol use: No     Frequency: Never     Current Outpatient Medications   Medication Sig Dispense Refill     albuterol 108 (90 Base) MCG/ACT IN inhaler Inhale 2 puffs into the lungs every 6 hours 1 Inhaler 0     UNABLE TO FIND MEDICATION NAME: Medical Marijuana       Allergies   Allergen Reactions     Soap Hives     Arabic spring soap          Past medical history, past surgical history, current medications and allergies reviewed and accurate to the best of my knowledge.        ROS:  Refer to HPI    /78   Pulse 92   Temp 98  F (36.7  C) (Tympanic)   Resp 18   Ht 1.778 m (5' 10\")   Wt 87.6 kg (193 lb 1.6 oz)   SpO2 94%   BMI 27.71 kg/m      EXAM:  General " Appearance: Well appearing adult male, appropriate appearance for age. No acute distress  Ears: Left TM intact, translucent with bony landmarks appreciated, no erythema, no effusion, no bulging, no purulence.  Right TM intact, translucent with bony landmarks appreciated, no erythema, no effusion, no bulging, no purulence.  Left auditory canal clear.  Right auditory canal clear.  Normal external ears, non tender.  Eyes: conjunctivae normal without erythema or irritation, corneas clear, no drainage or crusting, no eyelid swelling, pupils equal   Orophayrnx: moist mucous membranes, posterior pharynx with very minimal erythema, tonsils with 1+ hypertrophy, very minimal erythema, no exudates or petechiae, no post nasal drip seen, no trismus, voice clear.    Nose:  No active drainage noted   Neck: no palpable lymph nodes, mild tenderness over right side   Respiratory: normal chest wall and respirations.  Normal effort.  Clear to auscultation bilaterally, no wheezing, crackles or rhonchi.  No increased work of breathing.  No cough appreciated.  Cardiac: RRR with no murmurs  Musculoskeletal:  Equal movement of bilateral upper extremities.  Equal movement of bilateral lower extremities.  Normal gait.    Psychological: normal affect, alert, oriented, and pleasant.       Labs:  Results for orders placed or performed in visit on 03/06/20   Group A Streptococcus PCR Throat Swab     Status: None   Result Value Ref Range    Specimen Description Throat     Strep Group A PCR Not Detected NDET^Not Detected             ASSESSMENT/PLAN:  1. Sorethroat    - Group A Streptococcus PCR Throat Swab    2. Acute viral pharyngitis    Negative strep PCR test     Discussed with patient viral vs bacterial respiratory illness.    Discussed with patient that symptoms and exam are consistent with viral illness.      Symptomatic treatment - Encouraged fluids, salt water gargles, honey, elevation, humidifier,tea, lozenges, etc     May use  over-the-counter Tylenol or ibuprofen PRN    Discussed warning signs/symptoms indicative of need to f/u    Follow up if symptoms persist or worsen or concerns      I explained my diagnostic considerations and recommendations to the patient, who voiced understanding and agreement with the treatment plan. All questions were answered. We discussed potential side effects of any prescribed or recommended therapies, as well as expectations for response to treatments.    Disclaimer:  This note consists of words and symbols derived from keyboarding, dictation, or using voice recognition software. As a result, there may be errors in the script that have gone undetected. Please consider this when interpreting information found in this note.

## 2020-03-07 NOTE — NURSING NOTE
"Chief Complaint   Patient presents with     Pharyngitis     Sore throat has been going on for 4 days. Pain has since gotten worse and larger area    Initial There were no vitals taken for this visit. Estimated body mass index is 26.68 kg/m  as calculated from the following:    Height as of 9/5/19: 1.791 m (5' 10.5\").    Weight as of 2/21/20: 85.5 kg (188 lb 9.6 oz).    Medication Reconciliation: complete      Zi Javier LPN  "

## 2020-04-18 ENCOUNTER — HOSPITAL ENCOUNTER (EMERGENCY)
Facility: OTHER | Age: 35
Discharge: HOME | End: 2020-04-18
Attending: FAMILY MEDICINE | Admitting: FAMILY MEDICINE

## 2020-04-18 VITALS
DIASTOLIC BLOOD PRESSURE: 72 MMHG | SYSTOLIC BLOOD PRESSURE: 128 MMHG | TEMPERATURE: 97.6 F | HEIGHT: 70 IN | HEART RATE: 96 BPM | BODY MASS INDEX: 27.06 KG/M2 | RESPIRATION RATE: 20 BRPM | OXYGEN SATURATION: 96 % | WEIGHT: 189 LBS

## 2020-04-18 DIAGNOSIS — M54.13 RADICULOPATHY OF CERVICOTHORACIC REGION: ICD-10-CM

## 2020-04-18 DIAGNOSIS — S46.211A BICEPS STRAIN, RIGHT, INITIAL ENCOUNTER: ICD-10-CM

## 2020-04-18 LAB
ANION GAP SERPL CALCULATED.3IONS-SCNC: 7 MMOL/L (ref 3–14)
APTT PPP: 32 SEC (ref 22–37)
BASOPHILS # BLD AUTO: 0 10E9/L (ref 0–0.2)
BASOPHILS NFR BLD AUTO: 0.5 %
BUN SERPL-MCNC: 17 MG/DL (ref 7–25)
CALCIUM SERPL-MCNC: 9.2 MG/DL (ref 8.6–10.3)
CHLORIDE SERPL-SCNC: 106 MMOL/L (ref 98–107)
CK SERPL-CCNC: 219 U/L (ref 30–223)
CO2 SERPL-SCNC: 24 MMOL/L (ref 21–31)
CREAT SERPL-MCNC: 1.11 MG/DL (ref 0.7–1.3)
CRP SERPL-MCNC: 0.2 MG/L
D DIMER PPP DDU-MCNC: <200 NG/ML D-DU (ref 0–230)
DIFFERENTIAL METHOD BLD: NORMAL
EOSINOPHIL # BLD AUTO: 0.2 10E9/L (ref 0–0.7)
EOSINOPHIL NFR BLD AUTO: 2.2 %
ERYTHROCYTE [DISTWIDTH] IN BLOOD BY AUTOMATED COUNT: 13.4 % (ref 10–15)
ERYTHROCYTE [SEDIMENTATION RATE] IN BLOOD BY WESTERGREN METHOD: 9 MM/H (ref 1–10)
GFR SERPL CREATININE-BSD FRML MDRD: 75 ML/MIN/{1.73_M2}
GLUCOSE SERPL-MCNC: 132 MG/DL (ref 70–105)
HCT VFR BLD AUTO: 40.4 % (ref 40–53)
HGB BLD-MCNC: 14.1 G/DL (ref 13.3–17.7)
IMM GRANULOCYTES # BLD: 0 10E9/L (ref 0–0.4)
IMM GRANULOCYTES NFR BLD: 0.4 %
INR PPP: 0.99 (ref 0–1.3)
LYMPHOCYTES # BLD AUTO: 2.4 10E9/L (ref 0.8–5.3)
LYMPHOCYTES NFR BLD AUTO: 32 %
MCH RBC QN AUTO: 30.5 PG (ref 26.5–33)
MCHC RBC AUTO-ENTMCNC: 34.9 G/DL (ref 31.5–36.5)
MCV RBC AUTO: 87 FL (ref 78–100)
MONOCYTES # BLD AUTO: 0.5 10E9/L (ref 0–1.3)
MONOCYTES NFR BLD AUTO: 6.9 %
NEUTROPHILS # BLD AUTO: 4.4 10E9/L (ref 1.6–8.3)
NEUTROPHILS NFR BLD AUTO: 58 %
PLATELET # BLD AUTO: 271 10E9/L (ref 150–450)
POTASSIUM SERPL-SCNC: 3.6 MMOL/L (ref 3.5–5.1)
RBC # BLD AUTO: 4.63 10E12/L (ref 4.4–5.9)
SODIUM SERPL-SCNC: 137 MMOL/L (ref 134–144)
TROPONIN I SERPL-MCNC: <2.3 PG/ML
WBC # BLD AUTO: 7.6 10E9/L (ref 4–11)

## 2020-04-18 PROCEDURE — 85379 FIBRIN DEGRADATION QUANT: CPT | Performed by: FAMILY MEDICINE

## 2020-04-18 PROCEDURE — 85610 PROTHROMBIN TIME: CPT | Performed by: FAMILY MEDICINE

## 2020-04-18 PROCEDURE — 85025 COMPLETE CBC W/AUTO DIFF WBC: CPT | Performed by: FAMILY MEDICINE

## 2020-04-18 PROCEDURE — 85730 THROMBOPLASTIN TIME PARTIAL: CPT | Performed by: FAMILY MEDICINE

## 2020-04-18 PROCEDURE — 93005 ELECTROCARDIOGRAM TRACING: CPT | Performed by: FAMILY MEDICINE

## 2020-04-18 PROCEDURE — 36415 COLL VENOUS BLD VENIPUNCTURE: CPT | Performed by: FAMILY MEDICINE

## 2020-04-18 PROCEDURE — 25000131 ZZH RX MED GY IP 250 OP 636 PS 637: Performed by: FAMILY MEDICINE

## 2020-04-18 PROCEDURE — 99284 EMERGENCY DEPT VISIT MOD MDM: CPT | Performed by: FAMILY MEDICINE

## 2020-04-18 PROCEDURE — 85652 RBC SED RATE AUTOMATED: CPT | Performed by: FAMILY MEDICINE

## 2020-04-18 PROCEDURE — 93010 ELECTROCARDIOGRAM REPORT: CPT | Performed by: INTERNAL MEDICINE

## 2020-04-18 PROCEDURE — 82550 ASSAY OF CK (CPK): CPT | Performed by: FAMILY MEDICINE

## 2020-04-18 PROCEDURE — 25000132 ZZH RX MED GY IP 250 OP 250 PS 637: Performed by: FAMILY MEDICINE

## 2020-04-18 PROCEDURE — 86140 C-REACTIVE PROTEIN: CPT | Performed by: FAMILY MEDICINE

## 2020-04-18 PROCEDURE — 99283 EMERGENCY DEPT VISIT LOW MDM: CPT | Mod: Z6 | Performed by: FAMILY MEDICINE

## 2020-04-18 PROCEDURE — 84484 ASSAY OF TROPONIN QUANT: CPT | Performed by: FAMILY MEDICINE

## 2020-04-18 PROCEDURE — 80048 BASIC METABOLIC PNL TOTAL CA: CPT | Performed by: FAMILY MEDICINE

## 2020-04-18 RX ORDER — IBUPROFEN 800 MG/1
800 TABLET, FILM COATED ORAL EVERY 8 HOURS PRN
Qty: 24 TABLET | Refills: 0 | Status: SHIPPED | OUTPATIENT
Start: 2020-04-18 | End: 2020-04-26

## 2020-04-18 RX ORDER — PREDNISONE 20 MG/1
60 TABLET ORAL ONCE
Status: COMPLETED | OUTPATIENT
Start: 2020-04-18 | End: 2020-04-18

## 2020-04-18 RX ORDER — PREDNISONE 20 MG/1
40 TABLET ORAL DAILY
Qty: 8 TABLET | Refills: 0 | Status: SHIPPED | OUTPATIENT
Start: 2020-04-18 | End: 2020-04-22

## 2020-04-18 RX ORDER — TIZANIDINE 2 MG/1
4 TABLET ORAL ONCE
Status: COMPLETED | OUTPATIENT
Start: 2020-04-18 | End: 2020-04-18

## 2020-04-18 RX ADMIN — TIZANIDINE 4 MG: 2 TABLET ORAL at 02:02

## 2020-04-18 RX ADMIN — PREDNISONE 60 MG: 20 TABLET ORAL at 02:02

## 2020-04-18 ASSESSMENT — ENCOUNTER SYMPTOMS
NUMBNESS: 0
NECK STIFFNESS: 0
MYALGIAS: 1
BACK PAIN: 0
COLOR CHANGE: 0
WEAKNESS: 0
COUGH: 0
FEVER: 0
ARTHRALGIAS: 0
NECK PAIN: 0
ABDOMINAL PAIN: 0
SHORTNESS OF BREATH: 0

## 2020-04-18 ASSESSMENT — MIFFLIN-ST. JEOR: SCORE: 1793.55

## 2020-04-18 NOTE — ED PROVIDER NOTES
History     Chief Complaint   Patient presents with     Arm Pain     HPI  Von Santiago is a 34 year old male who preapproximately 5 days ago sents to ED complaining of arm pain.  Patient reports the pain began approximately 5 days ago. The pain is located in the right bicep area and described as sharp and burning, 7 out of 10 for severity. The pain is exacerbated by movement most particularly extension and abduction of the right shoulder and relieved by nothing. Denies associated inciting event or injury, redness, warmth, swelling, discoloration, fever, sweats, chills, chest pain, shortness of breath, lower extremity pain or swelling.  He denies any history of similar pain. Patient denies other complaints.  He did take 800 mg of ibuprofen approximately 1 hour prior to coming in.  He is concerned about a blood clot.    Allergies:  Allergies   Allergen Reactions     Soap Hives     [place]oven[place] spring soap        Problem List:    Patient Active Problem List    Diagnosis Date Noted     Esophageal reflux 02/14/2012     Priority: Medium        Past Medical History:    Past Medical History:   Diagnosis Date     Activity, other specified      Anxiety disorder      Cannabis dependence, uncomplicated (H)      Chondromalacia      Major depressive disorder, single episode      Meningitis        Past Surgical History:    Past Surgical History:   Procedure Laterality Date     OTHER SURGICAL HISTORY      Unremarkable       Family History:    Family History   Problem Relation Age of Onset     Other - See Comments Mother         Mother with hepatitis C at time of his birth.     Hyperlipidemia Mother      Diabetes Father      Heart Disease Maternal Grandfather      Heart Disease Maternal Grandmother      Colon Cancer No family hx of      Prostate Cancer No family hx of        Social History:  Marital Status:   [2]  Social History     Tobacco Use     Smoking status: Current Every Day Smoker     Packs/day: 0.50     Years:  "21.00     Pack years: 10.50     Types: Cigarettes     Smokeless tobacco: Never Used   Substance Use Topics     Alcohol use: No     Frequency: Never     Drug use: Yes     Types: Marijuana     Comment: medical        Medications:    ibuprofen (ADVIL/MOTRIN) 800 MG tablet  predniSONE (DELTASONE) 20 MG tablet  tiZANidine (ZANAFLEX) 4 MG tablet  albuterol 108 (90 Base) MCG/ACT IN inhaler  UNABLE TO FIND          Review of Systems   Constitutional: Negative for fever.   HENT: Negative for congestion.    Respiratory: Negative for cough and shortness of breath.    Cardiovascular: Negative for chest pain.   Gastrointestinal: Negative for abdominal pain.   Musculoskeletal: Positive for myalgias. Negative for arthralgias, back pain, neck pain and neck stiffness.   Skin: Negative for color change, pallor and rash.   Neurological: Negative for weakness and numbness.   All other systems reviewed and are negative.      Physical Exam   BP: 128/72  Pulse: 96  Temp: 97.6  F (36.4  C)  Resp: 20  Height: 177.8 cm (5' 10\")  Weight: 85.7 kg (189 lb)  SpO2: 96 %      Physical Exam  Vitals signs and nursing note reviewed.   Constitutional:       General: He is not in acute distress.     Appearance: He is well-developed. He is not diaphoretic.   HENT:      Head: Normocephalic and atraumatic.      Right Ear: External ear normal.      Left Ear: External ear normal.      Nose: Nose normal.      Mouth/Throat:      Lips: Pink.      Mouth: Mucous membranes are moist.      Pharynx: Oropharynx is clear.   Eyes:      Extraocular Movements: Extraocular movements intact.      Conjunctiva/sclera: Conjunctivae normal.      Pupils: Pupils are equal, round, and reactive to light.   Neck:      Musculoskeletal: Full passive range of motion without pain, normal range of motion and neck supple. Normal range of motion. No spinous process tenderness or muscular tenderness.      Thyroid: No thyromegaly.      Trachea: Trachea and phonation normal. "   Cardiovascular:      Rate and Rhythm: Normal rate and regular rhythm.      Pulses: Normal pulses.      Heart sounds: Normal heart sounds, S1 normal and S2 normal. No murmur.   Pulmonary:      Effort: Pulmonary effort is normal.      Breath sounds: Normal breath sounds. No decreased breath sounds, wheezing, rhonchi or rales.   Abdominal:      General: Bowel sounds are normal.      Palpations: Abdomen is soft.      Tenderness: There is no abdominal tenderness.   Musculoskeletal: Normal range of motion.      Right shoulder: Normal.      Right elbow: Normal.     Right wrist: Normal.      Cervical back: Normal.      Right upper arm: He exhibits tenderness. He exhibits no bony tenderness, no swelling, no edema, no deformity and no laceration.      Right forearm: Normal.      Right hand: Normal.   Lymphadenopathy:      Cervical: No cervical adenopathy.   Skin:     General: Skin is warm.      Capillary Refill: Capillary refill takes less than 2 seconds.   Neurological:      Mental Status: He is alert and oriented to person, place, and time.   Psychiatric:         Mood and Affect: Mood normal.         Behavior: Behavior normal. Behavior is cooperative.         ED Course     Procedures       EKG Interpretation:      Interpreted by Bahman Hay MD, MD  Time reviewed: 0215  Symptoms at time of EKG: right arm pain   Rhythm: normal sinus   Rate: normal  Axis: normal  Ectopy: none  Conduction: normal  ST Segments/ T Waves: No ST-T wave changes  Q Waves: none  Comparison to prior: Unchanged    Clinical Impression: normal EKG    Critical Care time:  none    Results for orders placed or performed during the hospital encounter of 04/18/20 (from the past 24 hour(s))   CBC with platelets differential   Result Value Ref Range    WBC 7.6 4.0 - 11.0 10e9/L    RBC Count 4.63 4.4 - 5.9 10e12/L    Hemoglobin 14.1 13.3 - 17.7 g/dL    Hematocrit 40.4 40.0 - 53.0 %    MCV 87 78 - 100 fl    MCH 30.5 26.5 - 33.0 pg    MCHC 34.9 31.5 - 36.5  g/dL    RDW 13.4 10.0 - 15.0 %    Platelet Count 271 150 - 450 10e9/L    Diff Method Automated Method     % Neutrophils 58.0 %    % Lymphocytes 32.0 %    % Monocytes 6.9 %    % Eosinophils 2.2 %    % Basophils 0.5 %    % Immature Granulocytes 0.4 %    Absolute Neutrophil 4.4 1.6 - 8.3 10e9/L    Absolute Lymphocytes 2.4 0.8 - 5.3 10e9/L    Absolute Monocytes 0.5 0.0 - 1.3 10e9/L    Absolute Eosinophils 0.2 0.0 - 0.7 10e9/L    Absolute Basophils 0.0 0.0 - 0.2 10e9/L    Abs Immature Granulocytes 0.0 0 - 0.4 10e9/L   Basic metabolic panel   Result Value Ref Range    Sodium 137 134 - 144 mmol/L    Potassium 3.6 3.5 - 5.1 mmol/L    Chloride 106 98 - 107 mmol/L    Carbon Dioxide 24 21 - 31 mmol/L    Anion Gap 7 3 - 14 mmol/L    Glucose 132 (H) 70 - 105 mg/dL    Urea Nitrogen 17 7 - 25 mg/dL    Creatinine 1.11 0.70 - 1.30 mg/dL    GFR Estimate 75 >60 mL/min/[1.73_m2]    GFR Estimate If Black >90 >60 mL/min/[1.73_m2]    Calcium 9.2 8.6 - 10.3 mg/dL   D-Dimer GH   Result Value Ref Range    D-Dimer ng/mL <200 0 - 230 ng/ml D-DU   INR   Result Value Ref Range    INR 0.99 0 - 1.3   Partial thromboplastin time   Result Value Ref Range    PTT 32 22 - 37 sec   CRP inflammation   Result Value Ref Range    CRP Inflammation 0.2 <0.5 mg/L   CK total   Result Value Ref Range    CK Total 219 30 - 223 U/L   Troponin GH   Result Value Ref Range    Troponin <2.3 <34.0 pg/mL       Medications   predniSONE (DELTASONE) tablet 60 mg (60 mg Oral Given 4/18/20 0202)   tiZANidine (ZANAFLEX) tablet 4 mg (4 mg Oral Given 4/18/20 0202)       Assessments & Plan (with Medical Decision Making)     I have reviewed the nursing notes.    EKG was obtained. There is no evidence of acute ischemia. Troponin was negative..    D-dimer was negative. There is no evidence of pulmonary embolus.    Lab tests were reviewed as above. Results were consistent with biceps strain versus cervical thoraco-radiculopathy.    Patient received medications as above.    I had a  discussion with the patient regarding the symptoms, exam, laboratory, EKG, X ray results, diagnosis, and plan.    I answered all questions to the best of my ability.  The patient is discharged home in good condition.  Follow-up with primary care physician next week.  Prednisone 40 mg daily for 4 more days, ibuprofen 8 her milligrams every 8 hours, Zanaflex 4 mg every 8 hours as needed.  The patient voiced understanding of the plan, was agreeable, and had no further questions or concerns. Advised to return for any worsening symptoms.        New Prescriptions    IBUPROFEN (ADVIL/MOTRIN) 800 MG TABLET    Take 1 tablet (800 mg) by mouth every 8 hours as needed for moderate pain    PREDNISONE (DELTASONE) 20 MG TABLET    Take 2 tablets (40 mg) by mouth daily for 4 days    TIZANIDINE (ZANAFLEX) 4 MG TABLET    Take 1 tablet (4 mg) by mouth 3 times daily as needed for muscle spasms (MUSCLE SPASM)       Final diagnoses:   Biceps strain, right, initial encounter   Radiculopathy of cervicothoracic region       4/18/2020   [facility] AND [facility]     Bahman Hay MD  04/18/20 6023

## 2020-04-18 NOTE — ED TRIAGE NOTES
"Patient presents to ER c/o right bicep pain.  He states it is 7/10 and \"burns\" for the past 5 days.  Patient does not have any unusual numbness or tingling in his fingers, CWMS intact.  He does state that he normally has numbness and tingling in his hands bilaterally.  He doesn't have any h/o blood clots, however is very frightened of this tonight.  When asked, he doesn't recall straining that arm or having any blunt trauma to the arm.  No swelling or redness noted.   "

## 2020-04-18 NOTE — ED AVS SNAPSHOT
[facility]  [address]  [place] [address]  Phone:  [phone]  Fax:  [phone]                                    Von Santiago   MRN: [id number]    Department:  [facility]   Date of Visit:  4/18/2020           After Visit Summary Signature Page    I have received my discharge instructions, and my questions have been answered. I have discussed any challenges I see with this plan with the nurse or doctor.    ..........................................................................................................................................  Patient/Patient Representative Signature      ..........................................................................................................................................  Patient Representative Print Name and Relationship to Patient    ..................................................               ................................................  Date                                   Time    ..........................................................................................................................................  Reviewed by Signature/Title    ...................................................              ..............................................  Date                                               Time          22EPIC Rev 08/18

## 2020-04-20 LAB — INTERPRETATION ECG - MUSE: NORMAL

## 2020-04-20 RX ORDER — IBUPROFEN 800 MG/1
800 TABLET, FILM COATED ORAL EVERY 8 HOURS PRN
Qty: 24 TABLET | Refills: 0 | Status: SHIPPED | OUTPATIENT
Start: 2020-04-20

## 2020-04-20 NOTE — DISCHARGE SUMMARY
Noted refill failed for ibuprofen.  Order resent-receipt confirmed by pharmacy.    Rachael Ortiz RN  ....................  4/20/2020   9:30 AM

## 2020-08-22 ENCOUNTER — APPOINTMENT (OUTPATIENT)
Dept: GENERAL RADIOLOGY | Facility: OTHER | Age: 35
End: 2020-08-22
Attending: FAMILY MEDICINE
Payer: MEDICAID

## 2020-08-22 ENCOUNTER — HOSPITAL ENCOUNTER (EMERGENCY)
Facility: OTHER | Age: 35
Discharge: HOME | End: 2020-08-22
Attending: EMERGENCY MEDICINE | Admitting: EMERGENCY MEDICINE
Payer: MEDICAID

## 2020-08-22 VITALS
RESPIRATION RATE: 20 BRPM | OXYGEN SATURATION: 100 % | HEIGHT: 72 IN | BODY MASS INDEX: 26.01 KG/M2 | DIASTOLIC BLOOD PRESSURE: 78 MMHG | HEART RATE: 77 BPM | WEIGHT: 192 LBS | SYSTOLIC BLOOD PRESSURE: 121 MMHG | TEMPERATURE: 97.7 F

## 2020-08-22 DIAGNOSIS — S83.91XA SPRAIN OF RIGHT KNEE, UNSPECIFIED LIGAMENT, INITIAL ENCOUNTER: ICD-10-CM

## 2020-08-22 PROCEDURE — 73562 X-RAY EXAM OF KNEE 3: CPT | Mod: RT

## 2020-08-22 PROCEDURE — 99283 EMERGENCY DEPT VISIT LOW MDM: CPT | Performed by: EMERGENCY MEDICINE

## 2020-08-22 PROCEDURE — 25000132 ZZH RX MED GY IP 250 OP 250 PS 637: Performed by: EMERGENCY MEDICINE

## 2020-08-22 PROCEDURE — 99283 EMERGENCY DEPT VISIT LOW MDM: CPT | Mod: Z6 | Performed by: EMERGENCY MEDICINE

## 2020-08-22 RX ORDER — IBUPROFEN 400 MG/1
800 TABLET, FILM COATED ORAL ONCE
Status: COMPLETED | OUTPATIENT
Start: 2020-08-22 | End: 2020-08-22

## 2020-08-22 RX ORDER — IBUPROFEN 800 MG/1
800 TABLET, FILM COATED ORAL EVERY 8 HOURS PRN
Qty: 60 TABLET | Refills: 0 | Status: SHIPPED | OUTPATIENT
Start: 2020-08-22 | End: 2020-08-30

## 2020-08-22 RX ADMIN — IBUPROFEN 800 MG: 400 TABLET, FILM COATED ORAL at 20:23

## 2020-08-22 ASSESSMENT — ENCOUNTER SYMPTOMS
SHORTNESS OF BREATH: 0
VOMITING: 0
FEVER: 0
CHEST TIGHTNESS: 0
AGITATION: 0
LIGHT-HEADEDNESS: 0
DYSURIA: 0
NAUSEA: 0
ARTHRALGIAS: 1
CHILLS: 0

## 2020-08-22 ASSESSMENT — MIFFLIN-ST. JEOR: SCORE: 1838.91

## 2020-08-22 NOTE — ED TRIAGE NOTES
Patient presents via WC stating that he was playing volleyball and was running, felt his knee pop to the right and then to the left. He is unable to bear weight on right leg.

## 2020-08-22 NOTE — ED AVS SNAPSHOT
[facility]  [address]  [place] [address]  Phone:  [phone]  Fax:  [phone]                                    Von Santiago   MRN: [id number]    Department:  [facility]   Date of Visit:  8/22/2020           After Visit Summary Signature Page    I have received my discharge instructions, and my questions have been answered. I have discussed any challenges I see with this plan with the nurse or doctor.    ..........................................................................................................................................  Patient/Patient Representative Signature      ..........................................................................................................................................  Patient Representative Print Name and Relationship to Patient    ..................................................               ................................................  Date                                   Time    ..........................................................................................................................................  Reviewed by Signature/Title    ...................................................              ..............................................  Date                                               Time          22EPIC Rev 08/18

## 2020-08-23 NOTE — DISCHARGE INSTRUCTIONS
Use ice, elevation, and you can alternate 800 mg of ibuprofen every 3 hours with 1000 mg of Tylenol.  Follow-up in clinic later this week if not improving.

## 2020-08-23 NOTE — ED PROVIDER NOTES
History     Chief Complaint   Patient presents with     Leg Injury     HPI  Von Santiago is a 34 year old male who was playing volleyball when his right knee gave out on him.  He reports that it bent first 1 way and then to the other side.  Since that time he has been unable to bear weight.  There is no swelling.  No deformity.  Has not taken anything for the pain.    Allergies:  Allergies   Allergen Reactions     Soap Hives     [place] spring soap        Problem List:    Patient Active Problem List    Diagnosis Date Noted     Esophageal reflux 02/14/2012     Priority: Medium        Past Medical History:    Past Medical History:   Diagnosis Date     Activity, other specified      Anxiety disorder      Cannabis dependence, uncomplicated (H)      Chondromalacia      Major depressive disorder, single episode      Meningitis        Past Surgical History:    Past Surgical History:   Procedure Laterality Date     OTHER SURGICAL HISTORY      Unremarkable       Family History:    Family History   Problem Relation Age of Onset     Other - See Comments Mother         Mother with hepatitis C at time of his birth.     Hyperlipidemia Mother      Diabetes Father      Heart Disease Maternal Grandfather      Heart Disease Maternal Grandmother      Colon Cancer No family hx of      Prostate Cancer No family hx of        Social History:  Marital Status:   [2]  Social History     Tobacco Use     Smoking status: Current Every Day Smoker     Packs/day: 1.00     Years: 21.00     Pack years: 21.00     Types: Cigarettes     Smokeless tobacco: Never Used   Substance Use Topics     Alcohol use: No     Frequency: Never     Drug use: Yes     Types: Marijuana     Comment: medical        Medications:    ibuprofen (ADVIL/MOTRIN) 800 MG tablet  albuterol 108 (90 Base) MCG/ACT IN inhaler  UNABLE TO FIND          Review of Systems   Constitutional: Negative for chills and fever.   HENT: Negative for congestion.    Eyes: Negative for  "visual disturbance.   Respiratory: Negative for chest tightness and shortness of breath.    Cardiovascular: Negative for chest pain.   Gastrointestinal: Negative for nausea and vomiting.   Genitourinary: Negative for dysuria.   Musculoskeletal: Positive for arthralgias.   Skin: Negative for rash.   Neurological: Negative for light-headedness.   Psychiatric/Behavioral: Negative for agitation.       Physical Exam   BP: 121/78  Pulse: 77  Temp: 97.7  F (36.5  C)  Resp: 20  Height: 182.9 cm (6')  Weight: 87.1 kg (192 lb)  SpO2: 100 %      Physical Exam  Vitals signs and nursing note reviewed.   Constitutional:       Appearance: Normal appearance.   HENT:      Head: Normocephalic and atraumatic.      Mouth/Throat:      Mouth: Mucous membranes are moist.   Eyes:      Conjunctiva/sclera: Conjunctivae normal.   Cardiovascular:      Rate and Rhythm: Normal rate.   Pulmonary:      Effort: Pulmonary effort is normal.   Abdominal:      General: Abdomen is flat.   Musculoskeletal:      Comments: No obvious deformity or swelling or effusion of the knee.  He is tender over both medial and collateral ligaments.  No joint laxity with stressing of the joints.  Negative Lochman's.     Skin:     General: Skin is warm and dry.   Neurological:      Mental Status: He is alert and oriented to person, place, and time.   Psychiatric:         Mood and Affect: Mood normal.         ED Course        Procedures               Results for orders placed or performed during the hospital encounter of 08/22/20 (from the past 24 hour(s))   XR Knee Right 3 Views    Narrative    PROCEDURE:  XR KNEE RT 3 VW    HISTORY: sports injury, patient heard \"pop\"    COMPARISON:  None.    TECHNIQUE:  3 views of the right knee were obtained.    FINDINGS:  No fracture or dislocation is identified. The joint spaces  are preserved.  There is no knee effusion.      Impression    IMPRESSION: No acute fracture.      MAURA DAVIS MD       Medications   ibuprofen " (ADVIL/MOTRIN) tablet 800 mg (has no administration in time range)       Assessments & Plan (with Medical Decision Making)     I have reviewed the nursing notes.    I have reviewed the findings, diagnosis, plan and need for follow up with the patient.  It appears he may have a medial collateral ligament injury.  Cannot rule out meniscal injury either.  He will be given a knee immobilizer and crutches.  He does not wish any narcotics so he will alternate ibuprofen and Tylenol.  Follow-up in clinic later this week.    New Prescriptions    No medications on file       Final diagnoses:   Sprain of right knee, unspecified ligament, initial encounter       8/22/2020   [facility]     Vamsi Samuel MD  08/22/20 2016

## 2020-08-28 ENCOUNTER — HOSPITAL ENCOUNTER (OUTPATIENT)
Dept: MRI IMAGING | Facility: OTHER | Age: 35
Discharge: HOME | End: 2020-08-28
Attending: FAMILY MEDICINE | Admitting: FAMILY MEDICINE
Payer: MEDICAID

## 2020-08-28 DIAGNOSIS — M23.91 INTERNAL DERANGEMENT OF RIGHT KNEE: ICD-10-CM

## 2020-08-28 PROCEDURE — 73721 MRI JNT OF LWR EXTRE W/O DYE: CPT | Mod: RT

## 2020-09-24 ENCOUNTER — TELEPHONE (OUTPATIENT)
Dept: FAMILY MEDICINE | Facility: OTHER | Age: 35
End: 2020-09-24

## 2020-09-24 ENCOUNTER — OFFICE VISIT (OUTPATIENT)
Dept: FAMILY MEDICINE | Facility: OTHER | Age: 35
End: 2020-09-24
Attending: FAMILY MEDICINE
Payer: MEDICAID

## 2020-09-24 VITALS
TEMPERATURE: 97.5 F | DIASTOLIC BLOOD PRESSURE: 60 MMHG | WEIGHT: 199.8 LBS | BODY MASS INDEX: 27.97 KG/M2 | HEIGHT: 71 IN | SYSTOLIC BLOOD PRESSURE: 130 MMHG | RESPIRATION RATE: 22 BRPM | OXYGEN SATURATION: 98 % | HEART RATE: 77 BPM

## 2020-09-24 DIAGNOSIS — S83.511D RUPTURE OF ANTERIOR CRUCIATE LIGAMENT OF RIGHT KNEE, SUBSEQUENT ENCOUNTER: Primary | ICD-10-CM

## 2020-09-24 PROCEDURE — 99213 OFFICE O/P EST LOW 20 MIN: CPT | Performed by: FAMILY MEDICINE

## 2020-09-24 PROCEDURE — G0463 HOSPITAL OUTPT CLINIC VISIT: HCPCS

## 2020-09-24 RX ORDER — ETODOLAC 500 MG
500 TABLET ORAL 2 TIMES DAILY
Qty: 30 TABLET | Refills: 1 | Status: SHIPPED | OUTPATIENT
Start: 2020-09-24 | End: 2021-11-23

## 2020-09-24 ASSESSMENT — PAIN SCALES - GENERAL: PAINLEVEL: WORST PAIN (10)

## 2020-09-24 ASSESSMENT — MIFFLIN-ST. JEOR: SCORE: 1858.42

## 2020-09-24 NOTE — PROGRESS NOTES
"  SUBJECTIVE:   Von Santiago is a 34 year old male who presents to clinic today for the following health issues: Right knee pain    Patient was seen in urgent care because of right knee pain.  1 month ago he tore his ACL playing volleyball.  Recently did get an A MRI showing a complete tear of his ACL.  He states he is not able to get surgery any earlier than November because of a wedding.  Vacation.  Does not want to be laid up.  Comes in for pain and also for a slip for his work.  He has trouble going up steps.  Excessive walking.  He has to wear a brace and a cane.  Feels like there is a burning sensation in his knee extending up into his leg.  He been using ibuprofen with minimal relief.        Patient Active Problem List    Diagnosis Date Noted     Esophageal reflux 02/14/2012     Priority: Medium     Past Medical History:   Diagnosis Date     Activity, other specified     Mother with hepatitis C at time of his birth.     Anxiety disorder     No Comments Provided     Cannabis dependence, uncomplicated (H)     No Comments Provided     Chondromalacia     Bilateral knee discomfort,     Major depressive disorder, single episode     No Comments Provided     Meningitis     as an infant        Review of Systems     OBJECTIVE:     /60 (BP Location: Right arm, Patient Position: Sitting, Cuff Size: Adult Regular)   Pulse 77   Temp 97.5  F (36.4  C) (Tympanic)   Resp 22   Ht 1.803 m (5' 11\")   Wt 90.6 kg (199 lb 12.8 oz)   SpO2 98%   BMI 27.87 kg/m    Body mass index is 27.87 kg/m .  Physical Exam  Skin:     General: Skin is warm.      Comments: There is a small joint effusion present.  Anterior draw   Neurological:      Mental Status: He is alert.         Diagnostic Test Results:  none     ASSESSMENT/PLAN:         1. Rupture of anterior cruciate ligament of right knee, subsequent encounter  Trial of Lodine.  Letter p provided per patient's request.  - etodolac (LODINE) 500 MG tablet; Take 1 tablet " (500 mg) by mouth 2 times daily  Dispense: 30 tablet; Refill: 1      Sam Turner MD  [facility]

## 2020-09-24 NOTE — LETTER
[facility]  [address]  [place] [address]  Phone: [phone]  Fax: [phone]    September 24, 2020        Von Santiago  [address] [facility] [address]          To whom it may concern:    RE: Von Santiago    Patient was seen and treated today at our clinic.  Patient may return to work  with the following:  Light duty- no excessive walking, avoid stairs, lifting greater than 10# and no kneeling or bending.      Please contact me for questions or concerns.      Sincerely,        Sam Turner MD

## 2020-09-24 NOTE — NURSING NOTE
"Chief Complaint   Patient presents with     Knee Pain     right     Patient presented to the clinic with right knee pain after he tore his ACL on 8/22/2020 and has been wearing a brace. They wanted to move his surgery up to next week but he is getting  and going on his honeymoon and he does not want to be laid up for that. He recently returned to work and that's when the pain started to get worse.    Initial /60 (BP Location: Right arm, Patient Position: Sitting, Cuff Size: Adult Regular)   Pulse 77   Temp 97.5  F (36.4  C) (Tympanic)   Resp 22   Ht 1.803 m (5' 11\")   Wt 90.6 kg (199 lb 12.8 oz)   SpO2 98%   BMI 27.87 kg/m   Estimated body mass index is 27.87 kg/m  as calculated from the following:    Height as of this encounter: 1.803 m (5' 11\").    Weight as of this encounter: 90.6 kg (199 lb 12.8 oz).    Medication Reconciliation: complete      Lorena Mccallum LPN  "

## 2020-10-27 ENCOUNTER — OFFICE VISIT (OUTPATIENT)
Dept: FAMILY MEDICINE | Facility: OTHER | Age: 35
End: 2020-10-27
Attending: FAMILY MEDICINE
Payer: COMMERCIAL

## 2020-10-27 VITALS
HEART RATE: 84 BPM | WEIGHT: 202.2 LBS | RESPIRATION RATE: 16 BRPM | HEIGHT: 70 IN | SYSTOLIC BLOOD PRESSURE: 110 MMHG | OXYGEN SATURATION: 97 % | DIASTOLIC BLOOD PRESSURE: 72 MMHG | TEMPERATURE: 97.6 F | BODY MASS INDEX: 28.95 KG/M2

## 2020-10-27 DIAGNOSIS — Z72.0 TOBACCO ABUSE: ICD-10-CM

## 2020-10-27 DIAGNOSIS — Z01.818 PREOP GENERAL PHYSICAL EXAM: Primary | ICD-10-CM

## 2020-10-27 PROCEDURE — 99406 BEHAV CHNG SMOKING 3-10 MIN: CPT | Performed by: FAMILY MEDICINE

## 2020-10-27 PROCEDURE — G0463 HOSPITAL OUTPT CLINIC VISIT: HCPCS | Mod: 25

## 2020-10-27 PROCEDURE — 99213 OFFICE O/P EST LOW 20 MIN: CPT | Mod: 25 | Performed by: FAMILY MEDICINE

## 2020-10-27 RX ORDER — NICOTINE 21 MG/24HR
1 PATCH, TRANSDERMAL 24 HOURS TRANSDERMAL EVERY 24 HOURS
Qty: 30 PATCH | Refills: 0 | Status: SHIPPED | OUTPATIENT
Start: 2020-12-26 | End: 2021-01-25

## 2020-10-27 RX ORDER — NICOTINE 21 MG/24HR
1 PATCH, TRANSDERMAL 24 HOURS TRANSDERMAL EVERY 24 HOURS
Qty: 30 PATCH | Refills: 1 | Status: SHIPPED | OUTPATIENT
Start: 2020-10-27 | End: 2020-12-26

## 2020-10-27 ASSESSMENT — PAIN SCALES - GENERAL: PAINLEVEL: MILD PAIN (3)

## 2020-10-27 ASSESSMENT — MIFFLIN-ST. JEOR: SCORE: 1852.39

## 2020-10-27 NOTE — PATIENT INSTRUCTIONS

## 2020-10-27 NOTE — NURSING NOTE
"Chief Complaint   Patient presents with     Pre-Op Exam     11-2-20 Procedure       Initial /72 (BP Location: Right arm, Patient Position: Chair, Cuff Size: Adult Large)   Pulse 84   Temp 97.6  F (36.4  C) (Tympanic)   Resp 16   Ht 1.784 m (5' 10.25\")   Wt 91.7 kg (202 lb 3.2 oz)   SpO2 97%   BMI 28.81 kg/m   Estimated body mass index is 28.81 kg/m  as calculated from the following:    Height as of this encounter: 1.784 m (5' 10.25\").    Weight as of this encounter: 91.7 kg (202 lb 3.2 oz).  Medication Reconciliation: complete      Elisabet Mohamud LPN on 10/27/2020 at 11:39 AM   "

## 2020-10-27 NOTE — PROGRESS NOTES
[facility]  [address]  [place] [address]  Phone: [phone]  Fax: [phone]  Primary Provider: Otis Funez      PREOPERATIVE EVALUATION:  Today's date: 10/27/2020    Von Santiago is a 35 year old male who presents for a preoperative evaluation.    Surgical Information:  Surgery/Procedure: Right ACL Repair  Surgery Location: [facility]  Surgeon: Dr. Leigh  Surgery Date: 11-2-2020  Time of Surgery: TBD  Where patient plans to recover: At home with family      Type of Anesthesia Anticipated: to be determined    Subjective     HPI related to upcoming procedure: 35 year old male tobacco smoker here for preop exam. He is at baseline health.   Injured knee and set for ACL repair. Has been using etodolac for pain, but if he is not walking a lot he does not need it and stopped taking for upcoming surgery on 11/2.    Smoking over 1 ppd now. Tried to quit in the past with nicotine patches, but when he reduced from 21 mg seemed to have more problems and started smoking more. Interested in quitting again.      Preop Questions 10/27/2020   1. Have you ever had a heart attack or stroke? No   2. Have you ever had surgery on your heart or blood vessels, such as a stent placement, a coronary artery bypass, or surgery on an artery in your head, neck, heart, or legs? No   3. Do you have chest pain with activity? No   4. Do you have a history of  heart failure? No   5. Do you currently have a cold, bronchitis or symptoms of other infection? No   6. Do you have a cough, shortness of breath, or wheezing? No   7. Do you or anyone in your family have previous history of blood clots? No   8. Do you or does anyone in your family have a serious bleeding problem such as prolonged bleeding following surgeries or cuts? YES - Mother had issues in the past    9. Have you ever had problems with anemia or been told to take iron pills? No   10. Have you had any abnormal blood loss  such as black, tarry or bloody stools? No   11. Have you ever had a blood transfusion? No   12. Are you willing to have a blood transfusion if it is medically needed before, during, or after your surgery? Yes   13. Have you or any of your relatives ever had problems with anesthesia? No   14. Do you have sleep apnea, excessive snoring or daytime drowsiness? No   15. Do you have any artifical heart valves or other implanted medical devices like a pacemaker, defibrillator, or continuous glucose monitor? No   16. Do you have artificial joints? No   17. Are you allergic to latex? No       Health Care Directive:  Patient does not have a Health Care Directive or Living Will: Discussed advance care planning with patient; however, patient declined at this time.      Review of Systems  General: Denies general constitutional problems  Eyes: Denies problems  Ears/Nose/Throat: Denies problems  Cardiovascular: Denies problems  Respiratory: Denies problems  Gastrointestinal: Denies problems  Genitourinary: Denies problems  Musculoskeletal: Denies problems  Skin: Denies problems  Neurologic: Denies problems  Psychiatric: Denies problems      Patient Active Problem List    Diagnosis Date Noted     Esophageal reflux 02/14/2012     Priority: Medium      Past Medical History:   Diagnosis Date     Activity, other specified     Mother with hepatitis C at time of his birth.     Anxiety disorder     No Comments Provided     Cannabis dependence, uncomplicated (H)     No Comments Provided     Chondromalacia     Bilateral knee discomfort,     Major depressive disorder, single episode     No Comments Provided     Meningitis     as an infant     Past Surgical History:   Procedure Laterality Date     OTHER SURGICAL HISTORY      Unremarkable     Current Outpatient Medications   Medication Sig Dispense Refill     etodolac (LODINE) 500 MG tablet Take 1 tablet (500 mg) by mouth 2 times daily 30 tablet 1     ibuprofen (ADVIL/MOTRIN) 800 MG tablet  "Take 1 tablet (800 mg) by mouth every 8 hours as needed for moderate pain 24 tablet 0     UNABLE TO FIND MEDICATION NAME: Medical Marijuana       albuterol 108 (90 Base) MCG/ACT IN inhaler Inhale 2 puffs into the lungs every 6 hours (Patient not taking: Reported on 10/27/2020) 1 Inhaler 0       Allergies   Allergen Reactions     Soap Hives     [place] spring soap         Social History     Tobacco Use     Smoking status: Current Every Day Smoker     Packs/day: 1.00     Years: 21.00     Pack years: 21.00     Types: Cigarettes     Smokeless tobacco: Never Used     Tobacco comment: would like something to help quit.   Substance Use Topics     Alcohol use: No     Frequency: Never     Family History   Problem Relation Age of Onset     Other - See Comments Mother         Mother with hepatitis C at time of his birth.     Hyperlipidemia Mother      Diabetes Father      Heart Disease Maternal Grandfather      Heart Disease Maternal Grandmother      Colon Cancer No family hx of      Prostate Cancer No family hx of      History   Drug Use     Types: Marijuana     Comment: medical         Objective     /72 (BP Location: Right arm, Patient Position: Chair, Cuff Size: Adult Large)   Pulse 84   Temp 97.6  F (36.4  C) (Tympanic)   Resp 16   Ht 1.784 m (5' 10.25\")   Wt 91.7 kg (202 lb 3.2 oz)   SpO2 97%   BMI 28.81 kg/m      Physical Exam  General Appearance: Pleasant, alert, appropriate appearance for age. No acute distress  Ear Exam: Normal TM's bilaterally.   OroPharynx Exam: Dental hygiene adequate. Normal buccal mucosa. Normal pharynx. Mallampati 1/4.  Neck Exam: Supple, no masses or nodes.  Chest/Respiratory Exam: Normal chest wall and respirations. Clear to auscultation.  Cardiovascular Exam: Regular rate and rhythm. S1, S2, no murmur, click, gallop, or rubs.  Extremities: 2 + pedal pulses.  No lower extremity edema.  Neurologic Exam: Nonfocal; symmetric DTRs, normal gross motor movement, tone, and coordination. No " tremor.   Psychiatric: Normal affect and mentation      Recent Labs   Lab Test 04/18/20  0205 01/07/19  1147 11/20/18  1700   HGB 14.1  --  14.4     --  264   INR 0.99  --   --      --  141   POTASSIUM 3.6  --  3.7   CR 1.11  --  1.01   A1C  --  5.3  --         Diagnostics:  No labs were ordered during this visit.   No EKG required, no history of coronary heart disease, significant arrhythmia, peripheral arterial disease or other structural heart disease.    Revised Cardiac Risk Index (RCRI):  The patient has the following serious cardiovascular risks for perioperative complications:   - No serious cardiac risks = 0 points     RCRI Interpretation: 0 points: Class I (very low risk - 0.4% complication rate)         Assessment & Plan   The proposed surgical procedure is considered INTERMEDIATE risk.      ICD-10-CM    1. Preop general physical exam  Z01.818    2. Tobacco abuse  Z72.0 nicotine (NICODERM CQ) 21 MG/24HR 24 hr patch     nicotine (NICODERM CQ) 14 MG/24HR 24 hr patch     nicotine (NICODERM CQ) 7 MG/24HR 24 hr patch            Risks and Recommendations:  The patient has the following additional risks and recommendations for perioperative complications:   - No identified additional risk factors other than previously addressed    Medication Instructions:  Stop NSAIDS for surgery    RECOMMENDATION:  APPROVAL GIVEN to proceed with proposed procedure, without further diagnostic evaluation.    Reviewed available options for smoking cessation. Patient chose to use nicotine patches. Discussed appropriate use of medication. Plan for 2 months of 21 mg, then 1 month of 14 and 1 month of 7 mg patches. Performed tobacco cessation counseling for greater than 3 minutes.      Signed Electronically by: Otis Funez MD    Copy of this evaluation report is provided to requesting physician.    [facility] Preop Guidelines    Revised Cardiac Risk Index

## 2021-01-14 ENCOUNTER — ALLIED HEALTH/NURSE VISIT (OUTPATIENT)
Dept: FAMILY MEDICINE | Facility: OTHER | Age: 36
End: 2021-01-14
Attending: FAMILY MEDICINE
Payer: COMMERCIAL

## 2021-01-14 DIAGNOSIS — R06.02 SHORTNESS OF BREATH: Primary | ICD-10-CM

## 2021-01-14 PROCEDURE — C9803 HOPD COVID-19 SPEC COLLECT: HCPCS

## 2021-01-14 PROCEDURE — U0005 INFEC AGEN DETEC AMPLI PROBE: HCPCS | Mod: ZL | Performed by: FAMILY MEDICINE

## 2021-01-14 PROCEDURE — U0003 INFECTIOUS AGENT DETECTION BY NUCLEIC ACID (DNA OR RNA); SEVERE ACUTE RESPIRATORY SYNDROME CORONAVIRUS 2 (SARS-COV-2) (CORONAVIRUS DISEASE [COVID-19]), AMPLIFIED PROBE TECHNIQUE, MAKING USE OF HIGH THROUGHPUT TECHNOLOGIES AS DESCRIBED BY CMS-2020-01-R: HCPCS | Mod: ZL | Performed by: FAMILY MEDICINE

## 2021-01-15 LAB
LABORATORY COMMENT REPORT: NORMAL
SARS-COV-2 RNA RESP QL NAA+PROBE: NEGATIVE
SARS-COV-2 RNA RESP QL NAA+PROBE: NORMAL
SPECIMEN SOURCE: NORMAL
SPECIMEN SOURCE: NORMAL

## 2021-01-25 ENCOUNTER — OFFICE VISIT (OUTPATIENT)
Dept: FAMILY MEDICINE | Facility: OTHER | Age: 36
End: 2021-01-25
Attending: NURSE PRACTITIONER
Payer: COMMERCIAL

## 2021-01-25 ENCOUNTER — TELEPHONE (OUTPATIENT)
Dept: FAMILY MEDICINE | Facility: OTHER | Age: 36
End: 2021-01-25

## 2021-01-25 VITALS
OXYGEN SATURATION: 97 % | HEART RATE: 97 BPM | WEIGHT: 191.8 LBS | BODY MASS INDEX: 27.46 KG/M2 | HEIGHT: 70 IN | SYSTOLIC BLOOD PRESSURE: 102 MMHG | TEMPERATURE: 97.4 F | RESPIRATION RATE: 16 BRPM | DIASTOLIC BLOOD PRESSURE: 78 MMHG

## 2021-01-25 DIAGNOSIS — Z02.83 ENCOUNTER FOR DRUG SCREENING: Primary | ICD-10-CM

## 2021-01-25 LAB
AMPHETAMINES UR QL SCN: ABNORMAL
BARBITURATES UR QL: NOT DETECTED
BENZODIAZ UR QL: NOT DETECTED
BUPRENORPHINE UR QL: NOT DETECTED NG/ML
CANNABINOIDS UR QL: ABNORMAL NG/ML
COCAINE UR QL: NOT DETECTED
D-METHAMPHET UR QL: NOT DETECTED NG/ML
METHADONE UR QL SCN: NOT DETECTED
OPIATES UR QL SCN: NOT DETECTED
OXYCODONE UR QL: NOT DETECTED NG/ML
PCP UR QL SCN: NOT DETECTED
PROPOXYPH UR QL: NOT DETECTED NG/ML
TRICYCLICS UR QL SCN: NOT DETECTED NG/ML

## 2021-01-25 PROCEDURE — G0463 HOSPITAL OUTPT CLINIC VISIT: HCPCS

## 2021-01-25 PROCEDURE — 99212 OFFICE O/P EST SF 10 MIN: CPT | Performed by: NURSE PRACTITIONER

## 2021-01-25 PROCEDURE — 80307 DRUG TEST PRSMV CHEM ANLYZR: CPT | Mod: ZL | Performed by: NURSE PRACTITIONER

## 2021-01-25 RX ORDER — DEXTROAMPHETAMINE SACCHARATE, AMPHETAMINE ASPARTATE MONOHYDRATE, DEXTROAMPHETAMINE SULFATE AND AMPHETAMINE SULFATE 2.5; 2.5; 2.5; 2.5 MG/1; MG/1; MG/1; MG/1
10 CAPSULE, EXTENDED RELEASE ORAL 3 TIMES DAILY
COMMUNITY

## 2021-01-25 ASSESSMENT — MIFFLIN-ST. JEOR: SCORE: 1805.22

## 2021-01-25 ASSESSMENT — PAIN SCALES - GENERAL: PAINLEVEL: MILD PAIN (3)

## 2021-01-25 NOTE — NURSING NOTE
"No chief complaint on file.  Patient presented with getting UA for work.    Initial There were no vitals taken for this visit. Estimated body mass index is 28.81 kg/m  as calculated from the following:    Height as of 10/27/20: 1.784 m (5' 10.25\").    Weight as of 10/27/20: 91.7 kg (202 lb 3.2 oz).  Medication Reconciliation: complete    Angy Butler LPN    "

## 2021-01-25 NOTE — PROGRESS NOTES
HPI:    Von Santiago is a 35 year old male  who presents to [facility] today for a urine drug screen. 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 The patient states that he uses medicinal mariajuana, but the patient states that he stopped taking this 1 week ago. The patient is also taking prescription adderall  10mg daily.      Past Medical History:   Diagnosis Date     Activity, other specified     Mother with hepatitis C at time of his birth.     Anxiety disorder     No Comments Provided     Cannabis dependence, uncomplicated (H)     No Comments Provided     Chondromalacia     Bilateral knee discomfort,     Major depressive disorder, single episode     No Comments Provided     Meningitis     as an infant     Past Surgical History:   Procedure Laterality Date     OTHER SURGICAL HISTORY      Unremarkable     Social History     Tobacco Use     Smoking status: Current Every Day Smoker     Packs/day: 1.00     Years: 21.00     Pack years: 21.00     Types: Cigarettes     Smokeless tobacco: Never Used     Tobacco comment: would like something to help quit.   Substance Use Topics     Alcohol use: No     Frequency: Never     Current Outpatient Medications   Medication Sig Dispense Refill     ibuprofen (ADVIL/MOTRIN) 800 MG tablet Take 1 tablet (800 mg) by mouth every 8 hours as needed for moderate pain 24 tablet 0     nicotine (NICODERM CQ) 7 MG/24HR 24 hr patch Place 1 patch onto the skin every 24 hours 30 patch 0     albuterol 108 (90 Base) MCG/ACT IN inhaler Inhale 2 puffs into the lungs every 6 hours (Patient not taking: Reported on 10/27/2020) 1 Inhaler 0     etodolac (LODINE) 500 MG tablet Take 1 tablet (500 mg) by mouth 2 times daily (Patient not taking: Reported on 1/25/2021) 30 tablet 1     nicotine (NICODERM CQ) 14 MG/24HR 24 hr patch Place 1 patch onto the skin every 24 hours (Patient not taking: Reported on 1/25/2021) 30 patch 0      "UNABLE TO FIND MEDICATION NAME: Medical Marijuana       Allergies   Allergen Reactions     Soap Hives     [place]ian spring soap          Past medical history, past surgical history, current medications and allergies reviewed and accurate to the best of my knowledge.        ROS:  Refer to HPI    /78 (BP Location: Right arm, Patient Position: Sitting, Cuff Size: Adult Regular)   Pulse 97   Temp 97.4  F (36.3  C) (Temporal)   Resp 16   Ht 1.784 m (5' 10.25\")   Wt 87 kg (191 lb 12.8 oz)   SpO2 97%   BMI 27.32 kg/m      EXAM:  General Appearance: Well appearing male, appropriate appearance for age. No acute distress  Respiratory: normal chest wall and respirations.  Normal effort.  Clear to auscultation bilaterally, no wheezing, crackles or rhonchi.  No increased work of breathing.  No cough appreciated.  Cardiac: RRR with no murmurs  Psychological: normal affect, alert, oriented, and pleasant.       Labs:  Results for orders placed or performed in visit on 01/25/21   Drug of Abuse Screen, Urine     Status: Abnormal   Result Value Ref Range    Amphetamine Qual Urine Presumptive positive-Unconfirmed result (A) NDET^Not Detected    Benzodiazepine Qual Urine Not Detected NDET^Not Detected    Cocaine Qual Urine Not Detected NDET^Not Detected    Methadone Qual Urine Not Detected NDET^Not Detected    PCP Qual Urine Not Detected NDET^Not Detected    Opiates Qualitative Urine Not Detected NDET^Not Detected    Oxycodone Qualitative Urine Not Detected NDET^Not Detected ng/mL    Propoxyphene Qualitative Urine Not Detected NDET^Not Detected ng/mL    Tricyclic Antidepressants Qual Urine Not Detected NDET^Not Detected ng/mL    Methamphetamine Qualitative Urine Not Detected NDET^Not Detected ng/mL    Barbiturates Qual Urine Not Detected NDET^Not Detected    Cannabinoids Qualitative Urine Presumptive positive-Unconfirmed result (A) NDET^Not Detected ng/mL    Buprenorphine Qualitative Urine Not Detected NDET^Not Detected ng/mL "                 ASSESSMENT/PLAN:  1. Encounter for drug screening    - Drug of Abuse Screen, Urine    Discussed urine drug screen results with the patient. Informed him that he is positive for amphetamines and cannabinoids. The patient understands that he is positive for amphetamines due to his daily use of adderall.      The patient expressed understanding of the results.         Discussed warning signs/symptoms indicative of need to f/u    Follow up if symptoms persist or worsen or concerns      I explained my diagnostic considerations and recommendations to the patient, who voiced understanding and agreement with the treatment plan. All questions were answered. We discussed potential side effects of any prescribed or recommended therapies, as well as expectations for response to treatments.    Disclaimer:  This note consists of words and symbols derived from keyboarding, dictation, or using voice recognition software. As a result, there may be errors in the script that have gone undetected. Please consider this when interpreting information found in this note.

## 2021-01-26 NOTE — TELEPHONE ENCOUNTER
I called patient to let him know that housekeeping found DMV paperwork left in the room he was in. I told him that I would leave it up at the  in [facility] for him to .  Nora RIVAS CMA...1/25/2021 8:26 PM

## 2021-01-26 NOTE — TELEPHONE ENCOUNTER
Patient picked up the paperwork this morning from [facility].  Shanita Gonzalez LPN on 1/26/2021 at 11:20 AM

## 2021-04-13 ENCOUNTER — ALLIED HEALTH/NURSE VISIT (OUTPATIENT)
Dept: FAMILY MEDICINE | Facility: OTHER | Age: 36
End: 2021-04-13
Attending: FAMILY MEDICINE
Payer: COMMERCIAL

## 2021-04-13 DIAGNOSIS — Z20.822 COVID-19 RULED OUT: Primary | ICD-10-CM

## 2021-04-13 PROCEDURE — U0003 INFECTIOUS AGENT DETECTION BY NUCLEIC ACID (DNA OR RNA); SEVERE ACUTE RESPIRATORY SYNDROME CORONAVIRUS 2 (SARS-COV-2) (CORONAVIRUS DISEASE [COVID-19]), AMPLIFIED PROBE TECHNIQUE, MAKING USE OF HIGH THROUGHPUT TECHNOLOGIES AS DESCRIBED BY CMS-2020-01-R: HCPCS | Mod: ZL | Performed by: FAMILY MEDICINE

## 2021-04-13 PROCEDURE — U0005 INFEC AGEN DETEC AMPLI PROBE: HCPCS | Mod: ZL | Performed by: FAMILY MEDICINE

## 2021-04-13 PROCEDURE — C9803 HOPD COVID-19 SPEC COLLECT: HCPCS

## 2021-04-14 LAB
SARS-COV-2 RNA RESP QL NAA+PROBE: NORMAL
SPECIMEN SOURCE: NORMAL

## 2021-04-15 LAB
LABORATORY COMMENT REPORT: ABNORMAL
SARS-COV-2 RNA RESP QL NAA+PROBE: POSITIVE
SPECIMEN SOURCE: ABNORMAL

## 2021-04-25 ENCOUNTER — HEALTH MAINTENANCE LETTER (OUTPATIENT)
Age: 36
End: 2021-04-25

## 2021-10-09 ENCOUNTER — HEALTH MAINTENANCE LETTER (OUTPATIENT)
Age: 36
End: 2021-10-09

## 2021-11-16 ENCOUNTER — TRANSFERRED RECORDS (OUTPATIENT)
Dept: HEALTH INFORMATION MANAGEMENT | Facility: OTHER | Age: 36
End: 2021-11-16
Payer: COMMERCIAL

## 2021-11-23 ENCOUNTER — OFFICE VISIT (OUTPATIENT)
Dept: FAMILY MEDICINE | Facility: OTHER | Age: 36
End: 2021-11-23
Attending: FAMILY MEDICINE
Payer: COMMERCIAL

## 2021-11-23 ENCOUNTER — HOSPITAL ENCOUNTER (OUTPATIENT)
Dept: GENERAL RADIOLOGY | Facility: OTHER | Age: 36
End: 2021-11-23
Attending: FAMILY MEDICINE
Payer: COMMERCIAL

## 2021-11-23 VITALS
TEMPERATURE: 97.2 F | DIASTOLIC BLOOD PRESSURE: 62 MMHG | RESPIRATION RATE: 16 BRPM | SYSTOLIC BLOOD PRESSURE: 108 MMHG | BODY MASS INDEX: 26.64 KG/M2 | OXYGEN SATURATION: 99 % | WEIGHT: 187 LBS | HEART RATE: 80 BPM

## 2021-11-23 DIAGNOSIS — Z87.891 PERSONAL HISTORY OF TOBACCO USE, PRESENTING HAZARDS TO HEALTH: ICD-10-CM

## 2021-11-23 DIAGNOSIS — S89.91XA KNEE INJURY, RIGHT, INITIAL ENCOUNTER: ICD-10-CM

## 2021-11-23 DIAGNOSIS — S89.91XA KNEE INJURY, RIGHT, INITIAL ENCOUNTER: Primary | ICD-10-CM

## 2021-11-23 DIAGNOSIS — Z98.890 HISTORY OF REPAIR OF ACL: ICD-10-CM

## 2021-11-23 PROBLEM — R29.898 RIGHT LEG WEAKNESS: Status: ACTIVE | Noted: 2020-11-06

## 2021-11-23 PROCEDURE — 73562 X-RAY EXAM OF KNEE 3: CPT | Mod: RT

## 2021-11-23 PROCEDURE — G0463 HOSPITAL OUTPT CLINIC VISIT: HCPCS | Mod: 25

## 2021-11-23 PROCEDURE — 99213 OFFICE O/P EST LOW 20 MIN: CPT | Performed by: FAMILY MEDICINE

## 2021-11-23 RX ORDER — ALBUTEROL SULFATE 90 UG/1
2 AEROSOL, METERED RESPIRATORY (INHALATION) EVERY 6 HOURS
Qty: 18 G | Refills: 0 | Status: SHIPPED | OUTPATIENT
Start: 2021-11-23 | End: 2023-04-18

## 2021-11-23 ASSESSMENT — PAIN SCALES - GENERAL: PAINLEVEL: MILD PAIN (3)

## 2021-11-23 NOTE — NURSING NOTE
"Patient presents to the clinic for right knee pain.  Patient heard and felt is pop 3 days ago.  Decreased range of motion with swelling present at this time.      FOOD SECURITY SCREENING QUESTIONS  Hunger Vital Signs:  Within the past 12 months we worried whether our food would run out before we got money to buy more. Sometimes   Within the past 12 months the food we bought just didn't last and we didn't have money to get more. sometimes    Advance Care Directive on file? no  Advance Care Directive provided to patient? Declined.      Chief Complaint   Patient presents with     Musculoskeletal Problem       Initial /62 (BP Location: Right arm, Patient Position: Sitting, Cuff Size: Adult Large)   Pulse 80   Temp 97.2  F (36.2  C) (Tympanic)   Resp 16   Wt 84.8 kg (187 lb)   SpO2 99%   BMI 26.64 kg/m   Estimated body mass index is 26.64 kg/m  as calculated from the following:    Height as of 1/25/21: 1.784 m (5' 10.25\").    Weight as of this encounter: 84.8 kg (187 lb).  Medication Reconciliation: complete        Meron Stiles LPN       "

## 2021-11-23 NOTE — PROGRESS NOTES
"Nursing Notes:   Meron Stiles LPN  11/23/2021  9:59 AM  Sign at exiting of workspace  Patient presents to the clinic for right knee pain.  Patient heard and felt is pop 3 days ago.  Decreased range of motion with swelling present at this time.      FOOD SECURITY SCREENING QUESTIONS  Hunger Vital Signs:  Within the past 12 months we worried whether our food would run out before we got money to buy more. Sometimes   Within the past 12 months the food we bought just didn't last and we didn't have money to get more. sometimes    Advance Care Directive on file? no  Advance Care Directive provided to patient? Declined.      Chief Complaint   Patient presents with     Musculoskeletal Problem       Initial /62 (BP Location: Right arm, Patient Position: Sitting, Cuff Size: Adult Large)   Pulse 80   Temp 97.2  F (36.2  C) (Tympanic)   Resp 16   Wt 84.8 kg (187 lb)   SpO2 99%   BMI 26.64 kg/m   Estimated body mass index is 26.64 kg/m  as calculated from the following:    Height as of 1/25/21: 1.784 m (5' 10.25\").    Weight as of this encounter: 84.8 kg (187 lb).  Medication Reconciliation: complete        Meron Stiles LPN            Assessment & Plan       ICD-10-CM    1. Knee injury, right, initial encounter  S89.91XA XR Knee Right 3 Views     MR Knee Right w/o Contrast     Orthopedic  Referral   2. History of repair of ACL  Z98.890 XR Knee Right 3 Views     MR Knee Right w/o Contrast     Orthopedic  Referral   3. Personal history of tobacco use, presenting hazards to health  Z87.891 albuterol (PROAIR HFA/PROVENTIL HFA/VENTOLIN HFA) 108 (90 Base) MCG/ACT inhaler     Concern for partial ACL repair disruption based on exam  Knee x-ray shows previous anchors. No other clear abnormality.  Recommend MRI for further assessment  Referral back to [facility] to see Dr Leigh after MRI complete  While waiting for MRI and ortho evaluation, wear knee brace from after ACL " reconstruction      Refilled albuterol inhaler, uses as needed   Tobacco Cessation:   reports that he has been smoking cigarettes. He has a 21.00 pack-year smoking history. He has never used smokeless tobacco.  Tobacco Cessation Action Plan: Information offered: Patient not interested at this time      Otis Funez MD     [facility]      SUBJECTIVE:  36 year old male with history of ACL reconstruction presents for right knee injury. He slipped on ice 11/21 and felt a pop. Had swelling afterwards. Icing with some reduction in swelling.  Able to bend knee today.  Pain internally. Worse with certain movements. Feels a little unstable.        REVIEW OF SYSTEMS:    Pertinent items are noted in HPI.    Current Outpatient Medications   Medication Sig Dispense Refill     albuterol 108 (90 Base) MCG/ACT IN inhaler Inhale 2 puffs into the lungs every 6 hours 1 Inhaler 0     amphetamine-dextroamphetamine (ADDERALL XR) 10 MG 24 hr capsule Take 10 mg by mouth 2 times daily       ibuprofen (ADVIL/MOTRIN) 800 MG tablet Take 1 tablet (800 mg) by mouth every 8 hours as needed for moderate pain 24 tablet 0     UNABLE TO FIND MEDICATION NAME: Medical Marijuana       Allergies   Allergen Reactions     Other Environmental Allergy Shortness Of Breath     MATCHES     Soap Hives     [place] spring soap        OBJECTIVE:  /62 (BP Location: Right arm, Patient Position: Sitting, Cuff Size: Adult Large)   Pulse 80   Temp 97.2  F (36.2  C) (Tympanic)   Resp 16   Wt 84.8 kg (187 lb)   SpO2 99%   BMI 26.64 kg/m      EXAM:  General Appearance: Alert. No acute distress  Psychiatric: Normal affect and mentation  Musculoskeletal: Right knee with mild effusion. Nontender to palpation. No LCL or MCL instability. Seems to have some laxity of the ACL compared to left.     Results for orders placed or performed during the hospital encounter of 11/23/21   XR Knee Right 3 Views     Status: None    Narrative    Exam: XR  KNEE RIGHT 3 VIEWS    Technique: Right knee, 3 Views    Comparison: MRI of the right knee on 8/28/2020, right knee radiographs  on 8/22/2020    Exam reason: Knee injury, right, initial encounter; History of repair  of ACL    Findings:  No acute fracture or dislocation. Joint spaces are well maintained.   Prior anterior cruciate ligament repair.    Possible small joint effusion.      Impression    Impression:  No acute fracture or dislocation.    REUBEN TREVINO MD         SYSTEM ID:  [id number]

## 2021-11-29 ENCOUNTER — ALLIED HEALTH/NURSE VISIT (OUTPATIENT)
Dept: FAMILY MEDICINE | Facility: OTHER | Age: 36
End: 2021-11-29
Attending: FAMILY MEDICINE
Payer: COMMERCIAL

## 2021-11-29 DIAGNOSIS — Z20.822 COVID-19 RULED OUT: Primary | ICD-10-CM

## 2021-11-29 PROCEDURE — C9803 HOPD COVID-19 SPEC COLLECT: HCPCS

## 2021-11-29 PROCEDURE — U0005 INFEC AGEN DETEC AMPLI PROBE: HCPCS | Mod: ZL

## 2021-11-30 LAB — SARS-COV-2 RNA RESP QL NAA+PROBE: NEGATIVE

## 2021-12-03 ENCOUNTER — HOSPITAL ENCOUNTER (OUTPATIENT)
Dept: MRI IMAGING | Facility: OTHER | Age: 36
Discharge: HOME | End: 2021-12-03
Attending: FAMILY MEDICINE | Admitting: FAMILY MEDICINE
Payer: COMMERCIAL

## 2021-12-03 DIAGNOSIS — S89.91XA KNEE INJURY, RIGHT, INITIAL ENCOUNTER: ICD-10-CM

## 2021-12-03 DIAGNOSIS — Z98.890 HISTORY OF REPAIR OF ACL: ICD-10-CM

## 2021-12-03 PROCEDURE — 73721 MRI JNT OF LWR EXTRE W/O DYE: CPT | Mod: RT

## 2022-02-02 ENCOUNTER — HOSPITAL ENCOUNTER (OUTPATIENT)
Dept: MRI IMAGING | Facility: OTHER | Age: 37
Discharge: HOME | End: 2022-02-02
Attending: FAMILY MEDICINE | Admitting: FAMILY MEDICINE
Payer: COMMERCIAL

## 2022-02-02 DIAGNOSIS — S83.512A RUPTURE OF ANTERIOR CRUCIATE LIGAMENT OF LEFT KNEE, INITIAL ENCOUNTER: ICD-10-CM

## 2022-02-02 PROCEDURE — 73721 MRI JNT OF LWR EXTRE W/O DYE: CPT | Mod: LT

## 2022-05-21 ENCOUNTER — HEALTH MAINTENANCE LETTER (OUTPATIENT)
Age: 37
End: 2022-05-21

## 2022-09-17 ENCOUNTER — HEALTH MAINTENANCE LETTER (OUTPATIENT)
Age: 37
End: 2022-09-17

## 2023-04-18 ENCOUNTER — OFFICE VISIT (OUTPATIENT)
Dept: FAMILY MEDICINE | Facility: OTHER | Age: 38
End: 2023-04-18
Attending: NURSE PRACTITIONER
Payer: COMMERCIAL

## 2023-04-18 VITALS
HEIGHT: 70 IN | DIASTOLIC BLOOD PRESSURE: 74 MMHG | SYSTOLIC BLOOD PRESSURE: 120 MMHG | OXYGEN SATURATION: 98 % | BODY MASS INDEX: 27.32 KG/M2 | TEMPERATURE: 97.9 F | RESPIRATION RATE: 20 BRPM | HEART RATE: 100 BPM | WEIGHT: 190.8 LBS

## 2023-04-18 DIAGNOSIS — J06.9 VIRAL URI WITH COUGH: Primary | ICD-10-CM

## 2023-04-18 DIAGNOSIS — R06.2 WHEEZING: ICD-10-CM

## 2023-04-18 DIAGNOSIS — Z87.891 PERSONAL HISTORY OF TOBACCO USE, PRESENTING HAZARDS TO HEALTH: ICD-10-CM

## 2023-04-18 PROCEDURE — 99213 OFFICE O/P EST LOW 20 MIN: CPT | Performed by: NURSE PRACTITIONER

## 2023-04-18 PROCEDURE — G0463 HOSPITAL OUTPT CLINIC VISIT: HCPCS

## 2023-04-18 RX ORDER — PREDNISONE 20 MG/1
40 TABLET ORAL DAILY
Qty: 10 TABLET | Refills: 0 | Status: SHIPPED | OUTPATIENT
Start: 2023-04-18 | End: 2023-04-23

## 2023-04-18 RX ORDER — ALBUTEROL SULFATE 90 UG/1
2 AEROSOL, METERED RESPIRATORY (INHALATION) EVERY 6 HOURS
Qty: 18 G | Refills: 0 | Status: SHIPPED | OUTPATIENT
Start: 2023-04-18

## 2023-04-18 ASSESSMENT — ENCOUNTER SYMPTOMS: SHORTNESS OF BREATH: 1

## 2023-04-18 ASSESSMENT — PAIN SCALES - GENERAL: PAINLEVEL: NO PAIN (0)

## 2023-04-18 NOTE — NURSING NOTE
"Patient presents today for shortness of breath. Patient complains it has been waking him up at night. Patient states,\"he  Feels like his lung are burning at times.      Medication Reconciliation Complete    Heidi Link LPN  4/18/2023 9:59 AM  "

## 2023-04-18 NOTE — PROGRESS NOTES
Assessment & Plan   Problem List Items Addressed This Visit    None  Visit Diagnoses     Viral URI with cough    -  Primary    Relevant Medications    predniSONE (DELTASONE) 20 MG tablet    albuterol (PROAIR HFA/PROVENTIL HFA/VENTOLIN HFA) 108 (90 Base) MCG/ACT inhaler    Wheezing        Relevant Medications    predniSONE (DELTASONE) 20 MG tablet    albuterol (PROAIR HFA/PROVENTIL HFA/VENTOLIN HFA) 108 (90 Base) MCG/ACT inhaler    Personal history of tobacco use, presenting hazards to health        Relevant Medications    albuterol (PROAIR HFA/PROVENTIL HFA/VENTOLIN HFA) 108 (90 Base) MCG/ACT inhaler         Viral upper respiratory with cough, wheezing present.  He does have chronic tobacco use.  Suspect some mild underlying COPD.  Treated with prednisone and refilled albuterol inhaler today.  Discussed symptomatic management, expected course of illness.  Follow-up as needed.  Antibiotics were not warranted at this time.    Prescription drug management      No follow-ups on file.    [facility]    Subjective   Von is a 37 year old, presenting for the following health issues:  Shortness of Breath    Shortness of Breath    History of Present Illness       Reason for visit:  Breathing issues    He eats 0-1 servings of fruits and vegetables daily.He consumes 4 sweetened beverage(s) daily.He exercises with enough effort to increase his heart rate 60 or more minutes per day.  He exercises with enough effort to increase his heart rate 5 days per week. He is missing 1 dose(s) of medications per week.     Comes in today for upper respiratory symptoms.  He reports he had some sinus issues for couple days that have resolved, this progressed into a cough that has had for the past 4 to 5 days.  He feels short of breath, wheezing and frequent coughing.  Denies any fevers.  He otherwise feels well other than his breathing.  Denies a history of asthma or COPD but does have a  "history of using an inhaler.  Does smoke tobacco on a regular basis.  He tried using his inhaler which was not helpful although this was .  No known ill contacts.  No recent COVID testing      Review of Systems   Respiratory: Positive for shortness of breath.    See above        Objective    /74   Pulse 100   Temp 97.9  F (36.6  C)   Resp 20   Ht 1.784 m (5' 10.25\")   Wt 86.5 kg (190 lb 12.8 oz)   SpO2 98%   BMI 27.18 kg/m    Body mass index is 27.18 kg/m .  Physical Exam   GENERAL: healthy, alert and no distress  EYES: Eyes grossly normal to inspection, PERRL and conjunctivae and sclerae normal  HENT: ear canals and TM's normal, nose and mouth without ulcers or lesions  NECK: no adenopathy, no asymmetry, masses, or scars and thyroid normal to palpation  RESP: Intermittent wheezing, occasional cough, no respiratory distress, O2 sats 98% on room air.  CV: regular rate and rhythm, normal S1 S2  NEURO: Normal strength and tone, mentation intact and speech normal  PSYCH: mentation appears normal, affect normal/bright        "

## 2023-06-04 ENCOUNTER — HEALTH MAINTENANCE LETTER (OUTPATIENT)
Age: 38
End: 2023-06-04

## 2024-07-13 ENCOUNTER — HEALTH MAINTENANCE LETTER (OUTPATIENT)
Age: 39
End: 2024-07-13

## 2025-07-19 ENCOUNTER — HEALTH MAINTENANCE LETTER (OUTPATIENT)
Age: 40
End: 2025-07-19

## (undated) RX ORDER — ONDANSETRON 4 MG/1
TABLET, ORALLY DISINTEGRATING ORAL
Status: DISPENSED
Start: 2018-11-20

## (undated) RX ORDER — METOCLOPRAMIDE 10 MG/1
TABLET ORAL
Status: DISPENSED
Start: 2018-11-20

## (undated) RX ORDER — FAMOTIDINE 20 MG/1
TABLET, FILM COATED ORAL
Status: DISPENSED
Start: 2018-11-20

## (undated) RX ORDER — PREDNISONE 20 MG/1
TABLET ORAL
Status: DISPENSED
Start: 2020-04-18

## (undated) RX ORDER — MAGNESIUM CARB/ALUMINUM HYDROX 105-160MG
TABLET,CHEWABLE ORAL
Status: DISPENSED
Start: 2018-11-20

## (undated) RX ORDER — IBUPROFEN 400 MG/1
TABLET, FILM COATED ORAL
Status: DISPENSED
Start: 2020-08-22

## (undated) RX ORDER — TIZANIDINE 2 MG/1
TABLET ORAL
Status: DISPENSED
Start: 2020-04-18